# Patient Record
Sex: MALE | Race: BLACK OR AFRICAN AMERICAN | NOT HISPANIC OR LATINO | URBAN - METROPOLITAN AREA
[De-identification: names, ages, dates, MRNs, and addresses within clinical notes are randomized per-mention and may not be internally consistent; named-entity substitution may affect disease eponyms.]

---

## 2018-11-27 ENCOUNTER — EMERGENCY (EMERGENCY)
Facility: HOSPITAL | Age: 68
LOS: 1 days | Discharge: HOME | End: 2018-11-27
Admitting: EMERGENCY MEDICINE
Payer: MEDICARE

## 2018-11-27 VITALS
SYSTOLIC BLOOD PRESSURE: 177 MMHG | OXYGEN SATURATION: 100 % | DIASTOLIC BLOOD PRESSURE: 75 MMHG | TEMPERATURE: 98 F | RESPIRATION RATE: 18 BRPM | HEART RATE: 80 BPM

## 2018-11-27 LAB
APPEARANCE UR: CLEAR — SIGNIFICANT CHANGE UP
BACTERIA # UR AUTO: NEGATIVE — SIGNIFICANT CHANGE UP
BILIRUB UR-MCNC: NEGATIVE — SIGNIFICANT CHANGE UP
BLOOD UR QL VISUAL: SIGNIFICANT CHANGE UP
COLOR SPEC: YELLOW — SIGNIFICANT CHANGE UP
GLUCOSE UR-MCNC: NEGATIVE — SIGNIFICANT CHANGE UP
HYALINE CASTS # UR AUTO: NEGATIVE — SIGNIFICANT CHANGE UP
KETONES UR-MCNC: NEGATIVE — SIGNIFICANT CHANGE UP
LEUKOCYTE ESTERASE UR-ACNC: NEGATIVE — SIGNIFICANT CHANGE UP
NITRITE UR-MCNC: NEGATIVE — SIGNIFICANT CHANGE UP
PH UR: 6 — SIGNIFICANT CHANGE UP (ref 5–8)
PROT UR-MCNC: 70 — SIGNIFICANT CHANGE UP
RBC CASTS # UR COMP ASSIST: HIGH (ref 0–?)
SP GR SPEC: 1.02 — SIGNIFICANT CHANGE UP (ref 1–1.04)
SQUAMOUS # UR AUTO: SIGNIFICANT CHANGE UP
UROBILINOGEN FLD QL: NORMAL — SIGNIFICANT CHANGE UP
WBC UR QL: SIGNIFICANT CHANGE UP (ref 0–?)

## 2018-11-27 PROCEDURE — 99283 EMERGENCY DEPT VISIT LOW MDM: CPT

## 2018-11-27 RX ORDER — LIDOCAINE 4 G/100G
1 CREAM TOPICAL ONCE
Qty: 0 | Refills: 0 | Status: COMPLETED | OUTPATIENT
Start: 2018-11-27 | End: 2018-11-27

## 2018-11-27 RX ORDER — IBUPROFEN 200 MG
600 TABLET ORAL ONCE
Qty: 0 | Refills: 0 | Status: COMPLETED | OUTPATIENT
Start: 2018-11-27 | End: 2018-11-27

## 2018-11-27 RX ADMIN — LIDOCAINE 1 PATCH: 4 CREAM TOPICAL at 22:12

## 2018-11-27 RX ADMIN — Medication 600 MILLIGRAM(S): at 22:12

## 2018-11-27 NOTE — ED ADULT TRIAGE NOTE - CHIEF COMPLAINT QUOTE
Ambulatory complaining of back pain since Sunday, has not medicated for pain since it started, worse when laying down. Denies trauma/injury, urinary complications. PMH HTN, DM.

## 2018-11-27 NOTE — ED PROVIDER NOTE - NSFOLLOWUPINSTRUCTIONS_ED_ALL_ED_FT
Please take Motrin 600mg every 8 hours with food for pain.  Please use heat on affected area 3-4 times per day

## 2018-11-27 NOTE — ED PROVIDER NOTE - OBJECTIVE STATEMENT
67 y/o male pmh htn, dm c/o left lower back pain x3 days. Pt admits to left lower back pain, worse with lying down, better with sitting up. Pt admits to pain radiating to left side. Pt denies taking any otc meds. Denies recent fall, injury or trauma. Pt denies chest pain, sob, abd pain, n/v/d, dysuria, hematuria, numbness, tingling, weakness, bowel or bladder incontinence, dizziness, syncope, fever or chills.

## 2018-11-27 NOTE — ED PROVIDER NOTE - PHYSICAL EXAMINATION
back- no midline tenderness. + left paraspinal tenderness in SI region. straight leg raise neg. 5/5 muscle strength in b/l LE, distal sensation intact.

## 2024-03-12 ENCOUNTER — EMERGENCY (EMERGENCY)
Facility: HOSPITAL | Age: 74
LOS: 1 days | Discharge: HOME | End: 2024-03-12
Attending: STUDENT IN AN ORGANIZED HEALTH CARE EDUCATION/TRAINING PROGRAM | Admitting: STUDENT IN AN ORGANIZED HEALTH CARE EDUCATION/TRAINING PROGRAM
Payer: MEDICARE

## 2024-03-12 VITALS
SYSTOLIC BLOOD PRESSURE: 193 MMHG | HEART RATE: 72 BPM | RESPIRATION RATE: 16 BRPM | OXYGEN SATURATION: 100 % | TEMPERATURE: 99 F | DIASTOLIC BLOOD PRESSURE: 87 MMHG

## 2024-03-12 VITALS
RESPIRATION RATE: 16 BRPM | DIASTOLIC BLOOD PRESSURE: 99 MMHG | HEART RATE: 70 BPM | SYSTOLIC BLOOD PRESSURE: 240 MMHG | TEMPERATURE: 98 F | OXYGEN SATURATION: 98 %

## 2024-03-12 LAB
ALBUMIN SERPL ELPH-MCNC: 4.2 G/DL — SIGNIFICANT CHANGE UP (ref 3.3–5)
ALP SERPL-CCNC: 95 U/L — SIGNIFICANT CHANGE UP (ref 40–120)
ALT FLD-CCNC: 18 U/L — SIGNIFICANT CHANGE UP (ref 4–41)
ANION GAP SERPL CALC-SCNC: 14 MMOL/L — SIGNIFICANT CHANGE UP (ref 7–14)
AST SERPL-CCNC: 25 U/L — SIGNIFICANT CHANGE UP (ref 4–40)
BASE EXCESS BLDV CALC-SCNC: 4.2 MMOL/L — HIGH (ref -2–3)
BASOPHILS # BLD AUTO: 0.02 K/UL — SIGNIFICANT CHANGE UP (ref 0–0.2)
BASOPHILS NFR BLD AUTO: 0.4 % — SIGNIFICANT CHANGE UP (ref 0–2)
BILIRUB SERPL-MCNC: 0.4 MG/DL — SIGNIFICANT CHANGE UP (ref 0.2–1.2)
BLOOD GAS VENOUS COMPREHENSIVE RESULT: SIGNIFICANT CHANGE UP
BUN SERPL-MCNC: 12 MG/DL — SIGNIFICANT CHANGE UP (ref 7–23)
CALCIUM SERPL-MCNC: 10.3 MG/DL — SIGNIFICANT CHANGE UP (ref 8.4–10.5)
CHLORIDE BLDV-SCNC: 103 MMOL/L — SIGNIFICANT CHANGE UP (ref 96–108)
CHLORIDE SERPL-SCNC: 101 MMOL/L — SIGNIFICANT CHANGE UP (ref 98–107)
CK MB BLD-MCNC: 1.5 % — SIGNIFICANT CHANGE UP (ref 0–2.5)
CK MB CFR SERPL CALC: 1.9 NG/ML — SIGNIFICANT CHANGE UP
CK SERPL-CCNC: 131 U/L — SIGNIFICANT CHANGE UP (ref 30–200)
CO2 BLDV-SCNC: 31.8 MMOL/L — HIGH (ref 22–26)
CO2 SERPL-SCNC: 25 MMOL/L — SIGNIFICANT CHANGE UP (ref 22–31)
CREAT SERPL-MCNC: 0.82 MG/DL — SIGNIFICANT CHANGE UP (ref 0.5–1.3)
EGFR: 93 ML/MIN/1.73M2 — SIGNIFICANT CHANGE UP
EOSINOPHIL # BLD AUTO: 0.05 K/UL — SIGNIFICANT CHANGE UP (ref 0–0.5)
EOSINOPHIL NFR BLD AUTO: 1.1 % — SIGNIFICANT CHANGE UP (ref 0–6)
GAS PNL BLDV: 136 MMOL/L — SIGNIFICANT CHANGE UP (ref 136–145)
GLUCOSE BLDV-MCNC: 136 MG/DL — HIGH (ref 70–99)
GLUCOSE SERPL-MCNC: 131 MG/DL — HIGH (ref 70–99)
HCO3 BLDV-SCNC: 30 MMOL/L — HIGH (ref 22–29)
HCT VFR BLD CALC: 41.3 % — SIGNIFICANT CHANGE UP (ref 39–50)
HCT VFR BLDA CALC: 42 % — SIGNIFICANT CHANGE UP (ref 39–51)
HGB BLD CALC-MCNC: 14 G/DL — SIGNIFICANT CHANGE UP (ref 12.6–17.4)
HGB BLD-MCNC: 14 G/DL — SIGNIFICANT CHANGE UP (ref 13–17)
IANC: 3.47 K/UL — SIGNIFICANT CHANGE UP (ref 1.8–7.4)
IMM GRANULOCYTES NFR BLD AUTO: 0.4 % — SIGNIFICANT CHANGE UP (ref 0–0.9)
LACTATE BLDV-MCNC: 1.8 MMOL/L — SIGNIFICANT CHANGE UP (ref 0.5–2)
LYMPHOCYTES # BLD AUTO: 0.76 K/UL — LOW (ref 1–3.3)
LYMPHOCYTES # BLD AUTO: 16.3 % — SIGNIFICANT CHANGE UP (ref 13–44)
MAGNESIUM SERPL-MCNC: 1.9 MG/DL — SIGNIFICANT CHANGE UP (ref 1.6–2.6)
MCHC RBC-ENTMCNC: 30.8 PG — SIGNIFICANT CHANGE UP (ref 27–34)
MCHC RBC-ENTMCNC: 33.9 GM/DL — SIGNIFICANT CHANGE UP (ref 32–36)
MCV RBC AUTO: 90.8 FL — SIGNIFICANT CHANGE UP (ref 80–100)
MONOCYTES # BLD AUTO: 0.35 K/UL — SIGNIFICANT CHANGE UP (ref 0–0.9)
MONOCYTES NFR BLD AUTO: 7.5 % — SIGNIFICANT CHANGE UP (ref 2–14)
NEUTROPHILS # BLD AUTO: 3.47 K/UL — SIGNIFICANT CHANGE UP (ref 1.8–7.4)
NEUTROPHILS NFR BLD AUTO: 74.3 % — SIGNIFICANT CHANGE UP (ref 43–77)
NRBC # BLD: 0 /100 WBCS — SIGNIFICANT CHANGE UP (ref 0–0)
NRBC # FLD: 0 K/UL — SIGNIFICANT CHANGE UP (ref 0–0)
PCO2 BLDV: 50 MMHG — SIGNIFICANT CHANGE UP (ref 42–55)
PH BLDV: 7.39 — SIGNIFICANT CHANGE UP (ref 7.32–7.43)
PHOSPHATE SERPL-MCNC: 4.1 MG/DL — SIGNIFICANT CHANGE UP (ref 2.5–4.5)
PLATELET # BLD AUTO: 221 K/UL — SIGNIFICANT CHANGE UP (ref 150–400)
PO2 BLDV: 41 MMHG — SIGNIFICANT CHANGE UP (ref 25–45)
POTASSIUM BLDV-SCNC: 4.3 MMOL/L — SIGNIFICANT CHANGE UP (ref 3.5–5.1)
POTASSIUM SERPL-MCNC: 4.4 MMOL/L — SIGNIFICANT CHANGE UP (ref 3.5–5.3)
POTASSIUM SERPL-SCNC: 4.4 MMOL/L — SIGNIFICANT CHANGE UP (ref 3.5–5.3)
PROT SERPL-MCNC: 7.9 G/DL — SIGNIFICANT CHANGE UP (ref 6–8.3)
RBC # BLD: 4.55 M/UL — SIGNIFICANT CHANGE UP (ref 4.2–5.8)
RBC # FLD: 15 % — HIGH (ref 10.3–14.5)
SAO2 % BLDV: 63.8 % — LOW (ref 67–88)
SODIUM SERPL-SCNC: 140 MMOL/L — SIGNIFICANT CHANGE UP (ref 135–145)
TROPONIN T, HIGH SENSITIVITY RESULT: 14 NG/L — SIGNIFICANT CHANGE UP
TROPONIN T, HIGH SENSITIVITY RESULT: 18 NG/L — SIGNIFICANT CHANGE UP
WBC # BLD: 4.67 K/UL — SIGNIFICANT CHANGE UP (ref 3.8–10.5)
WBC # FLD AUTO: 4.67 K/UL — SIGNIFICANT CHANGE UP (ref 3.8–10.5)

## 2024-03-12 PROCEDURE — 93010 ELECTROCARDIOGRAM REPORT: CPT

## 2024-03-12 PROCEDURE — 99285 EMERGENCY DEPT VISIT HI MDM: CPT

## 2024-03-12 PROCEDURE — 70450 CT HEAD/BRAIN W/O DYE: CPT | Mod: 26,MC

## 2024-03-12 RX ORDER — ACETAMINOPHEN 500 MG
1000 TABLET ORAL ONCE
Refills: 0 | Status: COMPLETED | OUTPATIENT
Start: 2024-03-12 | End: 2024-03-12

## 2024-03-12 RX ORDER — SODIUM CHLORIDE 9 MG/ML
1000 INJECTION INTRAMUSCULAR; INTRAVENOUS; SUBCUTANEOUS ONCE
Refills: 0 | Status: COMPLETED | OUTPATIENT
Start: 2024-03-12 | End: 2024-03-12

## 2024-03-12 RX ADMIN — SODIUM CHLORIDE 1000 MILLILITER(S): 9 INJECTION INTRAMUSCULAR; INTRAVENOUS; SUBCUTANEOUS at 12:17

## 2024-03-12 RX ADMIN — Medication 400 MILLIGRAM(S): at 12:17

## 2024-03-12 NOTE — ED PROVIDER NOTE - NSICDXPASTMEDICALHX_GEN_ALL_CORE_FT
PAST MEDICAL HISTORY:  Diabetes Mellitus Type II (ICD9 250.00)     HTN - Hypertension     Prostate cancer

## 2024-03-12 NOTE — ED ADULT NURSE REASSESSMENT NOTE - NS ED NURSE REASSESS COMMENT FT1
BREAK RN: pt. remains A&Ox4, awake and resting. pt. endorsing a headache at this time, order placed and followed, will reassess. no acute distress noted. respirations even and unlabored. VS as noted. pending rpt lab work.

## 2024-03-12 NOTE — ED PROVIDER NOTE - OBJECTIVE STATEMENT
72 y/o M with HTN, T2DM on insulin and metformin, newly dx prostate cancer 11/2023 on XRT only came on due to home nurse's recommendation for elevated BP.     Pt states he has been experiencing mild dull gradual atraumatic non-radiating onset episodic generalized headaches since 09/2023 hat typically resolved with otc medication, that he did not take today. States he took his 40 of metoprolol and lisinopril at around 8 AM today and when then nurse check his BP was high so was told to home to the ER. No personal or family hx of stroke/ aneurysm    Denies change in vision, blurred vision, photophobia, phonophobia, slurred speech, paresthesias, weakness, body shaking, urine/bowel incontinence, memory loss/confusion, fever, chills, sore throat, cough, chest pain, palpitations, dyspnea on exertion, shortness of breath, leg swelling/pain, rash, neck pain/stiffness

## 2024-03-12 NOTE — ED ADULT TRIAGE NOTE - CHIEF COMPLAINT QUOTE
pt c/o hypertension noted since yesterday. visiting nurse was checking BP at home yesterday when noted it to be in the 200's systolically. reports took BP medication this morning. has been endorsing intermittent headaches X 4 months. denies numbness/tingling, blurry vision. neurosensory intact. past medical history of HTN, diabetes type 2. fingerstick 114.

## 2024-03-12 NOTE — ED ADULT TRIAGE NOTE - BEFAST EYES
No
I have personally provided ___42 minutes of critical care time exclusive of time spent on separately billable procedures. Time includes review of laboratory data, radiology results, discussion with consultants, and monitoring for potential decompensation. Interventions were performed as documented above

## 2024-03-12 NOTE — ED ADULT NURSE NOTE - NSFALLUNIVINTERV_ED_ALL_ED
Bed/Stretcher in lowest position, wheels locked, appropriate side rails in place/Call bell, personal items and telephone in reach/Instruct patient to call for assistance before getting out of bed/chair/stretcher/Non-slip footwear applied when patient is off stretcher/[place] to call system/Physically safe environment - no spills, clutter or unnecessary equipment/Purposeful proactive rounding/Room/bathroom lighting operational, light cord in reach

## 2024-03-12 NOTE — ED PROVIDER NOTE - PATIENT PORTAL LINK FT
You can access the FollowMyHealth Patient Portal offered by [facility] by registering at the following website: [email or web address]. By joining [employer]’s FollowMyHealth portal, you will also be able to view your health information using other applications (apps) compatible with our system.

## 2024-03-12 NOTE — ED ADULT TRIAGE NOTE - MODE OF ARRIVAL
Hx of COPD  Home medications albuterol and incurse ellipta  Patient c/o of mild chest tightness but states he hasnt used his albuterol today to "open his lungs"     CXR WNL    Plan:  · Continue albuterol inhaler   · Atrovent ordered as incruse ellipta non formulary [place]

## 2024-03-12 NOTE — ED PROVIDER NOTE - PROGRESS NOTE DETAILS
DERREK Lund: Pt states the headache has resolved. labs with no emergent findings, with no signs of end organ damage. CT head also with no emergent findings. Discussed with pt that antihypertensive likely needs to be adjusted for better BP control. Bp also improved from 240/99 --> 193/87. Pt states he will speak to his PMD for medication adjustment /change. Discussed strict return precautions and prompt f/u. Pt verbalized an understanding and agrees with the plan. IV removed.

## 2024-03-12 NOTE — ED ADULT NURSE NOTE - NSFALLOOBATTEMPT_ED_ALL_ED
MEDICATIONS  (STANDING):  amLODIPine   Tablet 10 milliGRAM(s) Oral daily  apixaban 5 milliGRAM(s) Oral two times a day  aspirin enteric coated 81 milliGRAM(s) Oral daily  cefTRIAXone   IVPB 1000 milliGRAM(s) IV Intermittent every 24 hours  cholecalciferol 2000 Unit(s) Oral daily  cloNIDine Patch 0.1 mG/24Hr(s) 1 patch Transdermal every 7 days  hydrALAZINE 50 milliGRAM(s) Oral every 8 hours  levETIRAcetam 500 milliGRAM(s) Oral two times a day  metoprolol tartrate 50 milliGRAM(s) Oral every 12 hours  simvastatin 20 milliGRAM(s) Oral at bedtime    MEDICATIONS  (PRN):  acetaminophen     Tablet .. 650 milliGRAM(s) Oral every 6 hours PRN Temp greater or equal to 38C (100.4F), Mild Pain (1 - 3)  hydrALAZINE Injectable 10 milliGRAM(s) IV Push every 4 hours PRN for SBP > 180 or DBP > 110  ondansetron Injectable 4 milliGRAM(s) IV Push every 8 hours PRN Nausea and/or Vomiting   No

## 2024-03-12 NOTE — ED PROVIDER NOTE - CLINICAL SUMMARY MEDICAL DECISION MAKING FREE TEXT BOX
74 y/o M with HTN, T2DM on insulin and metformin, newly dx prostate cancer 11/2023 on XRT only came on due to home nurse's recommendation for elevated BP.     Patient presenting for evaluation of asymptomatic hypertension other than episodic atraumatic mild dull headaches since september.  On exam and through history there was no evidence of hypertensive emergency or urgency.  Patient without headache, decreased urinary output, vision changes, chest pain, or abnormal symptoms for patient.  At this time, most likely diagnosis is essential hypertension without evidence of hypertensive urgency/emergency.  Labs and imaging were unlikely to change my management of this patient however given BP of 240/99 will obtain CBC, CMP, troponin r/o organ involvement. EKG. Also will order CTH given newly dx prostate cancer 09/2023 on XRT only with episodic headaches since 09/2023.

## 2024-03-12 NOTE — ED PROVIDER NOTE - ATTENDING APP SHARED VISIT CONTRIBUTION OF CARE
I (Angely) agree with above, I performed a history and physical. Counseled magdlaena medical staff, physician assistant, and/or medical student on medical decision making as documented. Medical decisions and treatment interventions were made in real time during the patient encounter. Additionally and/or with the following exceptions: 73-year-old past medical history hypertension, type 2 diabetes on insulin and metformin presents emergency department with hypertension.  Was noted to be hypertensive to the 240s.  Patient has mild headache..  Patient is neurologically intact well-appearing.  Basic screening labs were sent which revealed no end organ dysfunction.  Given the history of prostate cancer, screening head CT was performed to rule out mass. Workup was negative and patient was stable for discharge.

## 2024-03-12 NOTE — ED PROVIDER NOTE - NSFOLLOWUPINSTRUCTIONS_ED_ALL_ED_FT
Hypertension    WHAT YOU NEED TO KNOW:    What is hypertension? Hypertension is high blood pressure. Your blood pressure is the force of your blood moving against the walls of your arteries. Hypertension causes your blood pressure to get so high that your heart has to work much harder than normal. This can damage your heart. Hypertension that does not respond to medicines and lifestyle changes is called resistant hypertension. Hypertension is considered chronic when it continues for 3 months or longer.    What do I need to know about the stages of hypertension? Your healthcare provider will give you a blood pressure goal based on your age, health, and risk for cardiovascular disease. The following are general guidelines on the stages of hypertension:    Normal blood pressure is 119/79 or lower. Your healthcare provider may only check your blood pressure each year if it stays at a normal level.    Elevated blood pressure is 120/79 to 129/79. This is sometimes called prehypertension. Your healthcare provider may suggest lifestyle changes to help lower your blood pressure to a normal level. He or she may then check it again in 3 to 6 months.    Stage 1 hypertension is 130/80 to 139/89. Your provider may recommend lifestyle changes, medication, and checks every 3 to 6 months until your blood pressure is controlled.    Stage 2 hypertension is 140/90 or higher. Your provider will recommend lifestyle changes and have you take 2 kinds of hypertension medicines. You will also need to have your blood pressure checked monthly until it is controlled.  Blood Pressure Readings  What increases my risk for hypertension? The cause of hypertension may not be known. This is called essential or primary hypertension. Hypertension caused by another medical condition, such as kidney disease, is called secondary hypertension. Any of the following can increase your risk:    Age older than 55 years (men) or 65 (women)    Stress, or a family history of hypertension or heart disease    Obesity, lack of exercise, or too many high-sodium foods    Use of tobacco, alcohol, or illegal drugs    A medical condition, such as diabetes, kidney disease, thyroid disease, or adrenal gland disorder    Certain medicines, such as steroids or birth control pills  What are the signs and symptoms of hypertension? You may have no signs or symptoms, or you may have any of the following:    Headache    Blurred vision    Chest pain    Dizziness or weakness    Trouble breathing    Nosebleeds  How is hypertension diagnosed? Your healthcare provider will take your blood pressure at several visits. You may also need to check your blood pressure at home. The provider will examine you and ask about medicines you take. He or she will also ask if you have a family history of high blood pressure and about any health conditions you have. He or she will also check your blood pressure and weight and examine your heart, lungs, and eyes. You may need any of the following tests:    An ambulatory blood pressure monitor (ABPM) is a device that you wear. ABPM measures your blood pressure while you do your regular daily activities. It records your blood pressure every 15 to 30 minutes during the day. It also records your blood pressure every 15 minutes to 1 hour at night. The recorded blood pressures help your healthcare provider know if you have hypertension not seen at your appointment.    Blood tests may help healthcare providers find the cause of your hypertension. Blood tests can also help find other health problems caused by hypertension.    Urine tests will be done to check your kidney function. Kidney problems can increase your risk for hypertension.  Which medicines are used to treat hypertension?    Antihypertensives may be used to help lower your blood pressure. Several kinds of medicines are available. Your healthcare provider will choose medicines based on the kind of hypertension you have. You may need more than one type of medicine. Take the medicine exactly as directed.    Diuretics help decrease extra fluid that collects in your body. This will help lower your blood pressure. You may urinate more often while you take this medicine.    Cholesterol medicine helps lower your cholesterol level. A low cholesterol level helps prevent heart disease and makes it easier to control your blood pressure.  What can I do to manage hypertension?    Check your blood pressure at home. Do not smoke, have caffeine, or exercise for at least 30 minutes before you check your blood pressure. Sit and rest for 5 minutes before you check your blood pressure. Extend your arm and support it on a flat surface. Your arm should be at the same level as your heart. Follow the directions that came with your blood pressure monitor. Check your blood pressure 2 times, 1 minute apart, before you take your medicine in the morning. Also check your blood pressure before your evening meal. Keep a record of your readings and bring it to your follow-up visits. Ask your healthcare provider what your blood pressure should be.  How to take a Blood Pressure      Manage any other health conditions you have. Health conditions such as diabetes can increase your risk for hypertension. Follow your healthcare provider's instructions and take all your medicines as directed.    Ask about all medicines. Certain medicines can increase your blood pressure. Examples include oral birth control pills, decongestants, herbal supplements, and NSAIDs, such as ibuprofen. Your healthcare provider can tell you which medicines are safe for you to take. This includes prescription and over-the-counter medicines.  What lifestyle changes can I make to manage hypertension? Your healthcare provider may recommend you work with a team to manage hypertension. The team may include medical experts such as a dietitian, an exercise or physical therapist, and a behavior therapist. Your family members may be included in helping you create lifestyle changes.  Ways to Lower Your Blood Pressure    Limit sodium (salt) as directed. Too much sodium can affect your fluid balance. Check labels to find low-sodium or no-salt-added foods. Some low-sodium foods use potassium salts for flavor. Too much potassium can also cause health problems. Your healthcare provider will tell you how much sodium and potassium are safe for you to have in a day. He or she may recommend that you limit sodium to 2,300 mg a day.        Follow the meal plan recommended by your healthcare provider. A dietitian or your provider can give you more information on low-sodium plans or the DASH (Dietary Approaches to Stop Hypertension) eating plan. The DASH plan is low in sodium, processed sugar, unhealthy fats, and total fat. It is high in potassium, calcium, and fiber. These can be found in vegetables, fruit, and whole-grain foods.        Be physically active throughout the day. Physical activity, such as exercise, can help control your blood pressure and your weight. Be physically active for at least 30 minutes per day, on most days of the week. Include aerobic activity, such as walking or riding a bicycle. Also include strength training at least 2 times each week. Your healthcare providers can help you create a physical activity plan.  Ways to Be Physically Active  Strength Training for Adults      Decrease stress. This may help lower your blood pressure. Learn ways to relax, such as deep breathing or listening to music.    Limit alcohol as directed. Alcohol can increase your blood pressure. A drink of alcohol is 12 ounces of beer, 5 ounces of wine, or 1½ ounces of liquor.    Do not smoke. Nicotine and other chemicals in cigarettes and cigars can increase your blood pressure and also cause lung damage. Ask your healthcare provider for information if you currently smoke and need help to quit. E-cigarettes or smokeless tobacco still contain nicotine. Talk to your healthcare provider before you use these products.  Prevent Heart Disease   Call your local emergency number (853 in the [place]) or have someone call if:    You have chest pain.    You have any of the following signs of a heart attack:  Squeezing, pressure, or pain in your chest    You may also have any of the following:  Discomfort or pain in your back, neck, jaw, stomach, or arm    Shortness of breath    Nausea or vomiting    Lightheadedness or a sudden cold sweat    You become confused or have trouble speaking.    You suddenly feel lightheaded or have trouble breathing.  When should I seek immediate care?    You have a severe headache or vision loss.    You have weakness in an arm or leg.  When should I call my doctor?    You feel faint, dizzy, confused, or drowsy.    You have been taking your blood pressure medicine but your pressure is higher than your provider says it should be.    You have questions or concerns about your condition or care.  CARE AGREEMENT:    You have the right to help plan your care. Learn about your health condition and how it may be treated. Discuss treatment options with your healthcare providers to decide what care you want to receive. You always have the right to refuse treatment.

## 2024-03-12 NOTE — ED ADULT NURSE NOTE - OBJECTIVE STATEMENT
Pt received to rm 24  , awake and alert, A&OX4, ambulatory. C/o pt c/o hypertension noted since yesterday. visiting nurse was checking BP at home yesterday when noted it to be in the 200's systolically. reports took BP medication this morning. has been endorsing intermittent headaches X 4 months. denies numbness/tingling, blurry vision. neurosensory intact. past medical history of HTN, diabetes type 2.    Respirations even and unlabored. Denies CP, SOB, N/V, HA, dizziness, palpitations, blurry vision. 20G IV placed to left wrist.     . Bed in lowest position, call bell within reach. Safety maintained.

## 2024-07-09 ENCOUNTER — INPATIENT (INPATIENT)
Facility: HOSPITAL | Age: 74
LOS: 2 days | Discharge: HOME | End: 2024-07-12
Attending: INTERNAL MEDICINE | Admitting: INTERNAL MEDICINE
Payer: MEDICARE

## 2024-07-09 VITALS
WEIGHT: 214.95 LBS | OXYGEN SATURATION: 98 % | HEART RATE: 79 BPM | TEMPERATURE: 99 F | SYSTOLIC BLOOD PRESSURE: 175 MMHG | RESPIRATION RATE: 16 BRPM | DIASTOLIC BLOOD PRESSURE: 65 MMHG

## 2024-07-09 DIAGNOSIS — R42 DIZZINESS AND GIDDINESS: ICD-10-CM

## 2024-07-09 PROBLEM — C61 MALIGNANT NEOPLASM OF PROSTATE: Chronic | Status: ACTIVE | Noted: 2024-03-12

## 2024-07-09 LAB
A1C WITH ESTIMATED AVERAGE GLUCOSE RESULT: 7.9 % — HIGH (ref 4–5.6)
ALBUMIN SERPL ELPH-MCNC: 3.9 G/DL — SIGNIFICANT CHANGE UP (ref 3.3–5)
ALP SERPL-CCNC: 98 U/L — SIGNIFICANT CHANGE UP (ref 40–120)
ALT FLD-CCNC: 18 U/L — SIGNIFICANT CHANGE UP (ref 4–41)
ANION GAP SERPL CALC-SCNC: 15 MMOL/L — HIGH (ref 7–14)
APTT BLD: 31 SEC — SIGNIFICANT CHANGE UP (ref 24.5–35.6)
AST SERPL-CCNC: 18 U/L — SIGNIFICANT CHANGE UP (ref 4–40)
BASOPHILS # BLD AUTO: 0.03 K/UL — SIGNIFICANT CHANGE UP (ref 0–0.2)
BASOPHILS NFR BLD AUTO: 0.5 % — SIGNIFICANT CHANGE UP (ref 0–2)
BILIRUB SERPL-MCNC: 0.3 MG/DL — SIGNIFICANT CHANGE UP (ref 0.2–1.2)
BUN SERPL-MCNC: 32 MG/DL — HIGH (ref 7–23)
CALCIUM SERPL-MCNC: 10.1 MG/DL — SIGNIFICANT CHANGE UP (ref 8.4–10.5)
CHLORIDE SERPL-SCNC: 104 MMOL/L — SIGNIFICANT CHANGE UP (ref 98–107)
CO2 SERPL-SCNC: 24 MMOL/L — SIGNIFICANT CHANGE UP (ref 22–31)
CREAT SERPL-MCNC: 1.7 MG/DL — HIGH (ref 0.5–1.3)
D DIMER BLD IA.RAPID-MCNC: 1968 NG/ML DDU — HIGH
EGFR: 42 ML/MIN/1.73M2 — LOW
EOSINOPHIL # BLD AUTO: 0.16 K/UL — SIGNIFICANT CHANGE UP (ref 0–0.5)
EOSINOPHIL NFR BLD AUTO: 2.6 % — SIGNIFICANT CHANGE UP (ref 0–6)
ESTIMATED AVERAGE GLUCOSE: 180 — SIGNIFICANT CHANGE UP
GLUCOSE SERPL-MCNC: 112 MG/DL — HIGH (ref 70–99)
HCT VFR BLD CALC: 35.9 % — LOW (ref 39–50)
HGB BLD-MCNC: 11.7 G/DL — LOW (ref 13–17)
IANC: 3.81 K/UL — SIGNIFICANT CHANGE UP (ref 1.8–7.4)
IMM GRANULOCYTES NFR BLD AUTO: 0.3 % — SIGNIFICANT CHANGE UP (ref 0–0.9)
INR BLD: <0.9 RATIO — LOW (ref 0.85–1.18)
LYMPHOCYTES # BLD AUTO: 1.63 K/UL — SIGNIFICANT CHANGE UP (ref 1–3.3)
LYMPHOCYTES # BLD AUTO: 26.3 % — SIGNIFICANT CHANGE UP (ref 13–44)
MAGNESIUM SERPL-MCNC: 2.1 MG/DL — SIGNIFICANT CHANGE UP (ref 1.6–2.6)
MCHC RBC-ENTMCNC: 32.1 PG — SIGNIFICANT CHANGE UP (ref 27–34)
MCHC RBC-ENTMCNC: 32.6 GM/DL — SIGNIFICANT CHANGE UP (ref 32–36)
MCV RBC AUTO: 98.4 FL — SIGNIFICANT CHANGE UP (ref 80–100)
MONOCYTES # BLD AUTO: 0.54 K/UL — SIGNIFICANT CHANGE UP (ref 0–0.9)
MONOCYTES NFR BLD AUTO: 8.7 % — SIGNIFICANT CHANGE UP (ref 2–14)
NEUTROPHILS # BLD AUTO: 3.81 K/UL — SIGNIFICANT CHANGE UP (ref 1.8–7.4)
NEUTROPHILS NFR BLD AUTO: 61.6 % — SIGNIFICANT CHANGE UP (ref 43–77)
NRBC # BLD: 0 /100 WBCS — SIGNIFICANT CHANGE UP (ref 0–0)
NRBC # FLD: 0 K/UL — SIGNIFICANT CHANGE UP (ref 0–0)
PLATELET # BLD AUTO: 239 K/UL — SIGNIFICANT CHANGE UP (ref 150–400)
POTASSIUM SERPL-MCNC: 4.8 MMOL/L — SIGNIFICANT CHANGE UP (ref 3.5–5.3)
POTASSIUM SERPL-SCNC: 4.8 MMOL/L — SIGNIFICANT CHANGE UP (ref 3.5–5.3)
PROT SERPL-MCNC: 7.4 G/DL — SIGNIFICANT CHANGE UP (ref 6–8.3)
PROTHROM AB SERPL-ACNC: 9.5 SEC — SIGNIFICANT CHANGE UP (ref 9.5–13)
RBC # BLD: 3.65 M/UL — LOW (ref 4.2–5.8)
RBC # FLD: 15.8 % — HIGH (ref 10.3–14.5)
SODIUM SERPL-SCNC: 143 MMOL/L — SIGNIFICANT CHANGE UP (ref 135–145)
TROPONIN T, HIGH SENSITIVITY RESULT: 19 NG/L — SIGNIFICANT CHANGE UP
TROPONIN T, HIGH SENSITIVITY RESULT: 21 NG/L — SIGNIFICANT CHANGE UP
TSH SERPL-MCNC: 0.61 UIU/ML — SIGNIFICANT CHANGE UP (ref 0.27–4.2)
WBC # BLD: 6.19 K/UL — SIGNIFICANT CHANGE UP (ref 3.8–10.5)
WBC # FLD AUTO: 6.19 K/UL — SIGNIFICANT CHANGE UP (ref 3.8–10.5)

## 2024-07-09 PROCEDURE — 71046 X-RAY EXAM CHEST 2 VIEWS: CPT | Mod: 26

## 2024-07-09 PROCEDURE — 71275 CT ANGIOGRAPHY CHEST: CPT | Mod: 26,MC

## 2024-07-09 PROCEDURE — 99285 EMERGENCY DEPT VISIT HI MDM: CPT

## 2024-07-09 RX ORDER — METFORMIN HYDROCHLORIDE 850 MG/1
1 TABLET, FILM COATED ORAL
Refills: 0 | DISCHARGE

## 2024-07-09 RX ORDER — METOPROLOL TARTRATE 50 MG
1 TABLET ORAL
Refills: 0 | DISCHARGE

## 2024-07-09 RX ORDER — CLONIDINE HYDROCHLORIDE 0.3 MG/1
1 TABLET ORAL
Refills: 0 | DISCHARGE

## 2024-07-09 RX ORDER — GLIPIZIDE 5 MG
1 TABLET ORAL
Refills: 0 | DISCHARGE

## 2024-07-09 RX ORDER — CILOSTAZOL 50 MG/1
1 TABLET ORAL
Refills: 0 | DISCHARGE

## 2024-07-09 RX ORDER — ASPIRIN 325 MG/1
1 TABLET, FILM COATED ORAL
Refills: 0 | DISCHARGE

## 2024-07-09 RX ORDER — TAMSULOSIN HYDROCHLORIDE 0.4 MG/1
1 CAPSULE ORAL
Refills: 0 | DISCHARGE

## 2024-07-09 RX ORDER — INSULIN GLARGINE 100 [IU]/ML
40 INJECTION, SOLUTION SUBCUTANEOUS
Refills: 0 | DISCHARGE

## 2024-07-09 RX ORDER — ATORVASTATIN CALCIUM 20 MG/1
1 TABLET, FILM COATED ORAL
Refills: 0 | DISCHARGE

## 2024-07-09 RX ORDER — LISINOPRIL 5 MG/1
1 TABLET ORAL
Refills: 0 | DISCHARGE

## 2024-07-09 NOTE — ED PROVIDER NOTE - OBJECTIVE STATEMENT
74-year-old male past medical history prostate cancer status post radiation last treatment in March, hypertension, type 2 diabetes non-insulin-dependent presents with palpitations and dizziness.  Patient endorses 3 months of palpitations, when ascending stairs. Endorses dizziness after walking up stairs starting 3 months ago as well. Dizziness is not worsened with head movement, not associated with room spinning sensation, no nausea/vomiting. Patient states he presents to ED today because of worsening dizziness yesterday when climbing stairs, lasting approximately 1 to 2 minutes.  Patient states that self resolved by standing still.  Patient denies chest pain, shortness of breath, difficulty breathing, headache, fever, chills, body aches. Patient denies recent travel, leg swelling,

## 2024-07-09 NOTE — ED ADULT NURSE NOTE - NSFALLHARMRISKINTERV_ED_ALL_ED

## 2024-07-09 NOTE — PHARMACOTHERAPY INTERVENTION NOTE - COMMENTS
Medication history is complete. Medication list updated in Outpatient Medication Record (OMR) per patient, [facility], and [facility].     Home Medications:  aspirin 81 mg oral delayed release tablet: 1 tab(s) orally once a day (09 Jul 2024 19:40)  atorvastatin 40 mg oral tablet: 1 tab(s) orally once a day (09 Jul 2024 19:39)  chlorthalidone 25 mg oral tablet: 1 tab(s) orally once a day (09 Jul 2024 19:39)  cilostazol 50 mg oral tablet: 1 tab(s) orally 2 times a day (09 Jul 2024 19:39)  cloNIDine 0.2 mg oral tablet: 1 tab(s) orally once a day (at bedtime) (09 Jul 2024 19:39)  glipiZIDE 10 mg oral tablet, extended release: 1 tab(s) orally once a day (09 Jul 2024 19:39)  IsoRel OTC supplement: Take orally as directed (09 Jul 2024 19:40)  lisinopril 40 mg oral tablet: 1 tab(s) orally once a day (09 Jul 2024 19:39)  metFORMIN 1000 mg oral tablet: 1 tab(s) orally 2 times a day (09 Jul 2024 19:39)  metoprolol succinate 50 mg oral tablet, extended release: 1 tab(s) orally once a day (09 Jul 2024 19:39)  tamsulosin 0.4 mg oral capsule: 1 cap(s) orally once a day (09 Jul 2024 19:39)  Toujeo SoloStar 300 units/mL subcutaneous solution: 40 unit(s) subcutaneous once a day after dinner (09 Jul 2024 19:39)    Please call spectra [phone] if you have any questions.

## 2024-07-09 NOTE — ED PROVIDER NOTE - ATTENDING CONTRIBUTION TO CARE
75 yo M hx HTN, HLD, DM2, prostate cancer s/p radiation, presenting with complaints of dizziness with exertion over the past few months, now with palpitations with exertion. Pt states he was in the pool at the gym yesterday and felt dizziness with palpitations and sob after doing laps.

## 2024-07-09 NOTE — ED ADULT TRIAGE NOTE - AS TEMP SITE
FETAL SURVEY  A SINGLE VIABLE IUP AT 20W1D GA BY LMP IS SEEN. FETAL CARDIAC MOTION OBSERVED. FETAL ANATOMY WELL VISUALIZED AND APPEARS WITHIN NORMAL LIMITS. NO ABNORMALITIES ARE SEEN ON TODAY'S EXAM.  FACE, NOSE/LIPS, PROFILE, CSP, LAT VENT, CER/CM, CP, SPINE, KIDNEYS, STOMACH/DIAPHRAGM, BLADDER, 3VC,  CORD INSERTION, RVOT, LVOT, 4CH, AO, DA, 3VV, ARMS, LEGS, HANDS, FEET. APPROPRIATE FETAL GROWTH IS SEEN. SIZE=DATES. JOANNA, CERVIX AND PLACENTA APPEAR WITHIN NORMAL LIMITS.   GENDER: XX  Problem List  Date Reviewed: 8/12/2021        Codes Class Noted    Supervision of other normal pregnancy ICD-10-CM: Z34.80  ICD-9-CM: V22.1  6/8/2021    Overview Addendum 8/20/2021  9:35 AM by Cesia Perez     Intrauterine pregnancy with the following problems identified:   EDC 1/19/2022 by D=US (ACOG)  NIPTS- normal  Horizon- neg  ?covid shot  AFP- Positive MFM 8/18  MFM - clot above cervix on ultrasound- addressed @MFM 8/18             ACL (anterior cruciate ligament) rupture ICD-10-CM: P06.158V  ICD-9-CM: 844.2  12/2/2019        Environmental allergies ICD-10-CM: Z91.09  ICD-9-CM: V15.09  3/23/2012
Saw MFM due to elevated AFP - has fu 10/1  US today normal anatomy and growth    Disc covid vaccine again
oral

## 2024-07-09 NOTE — ED PROVIDER NOTE - CLINICAL SUMMARY MEDICAL DECISION MAKING FREE TEXT BOX
74-year-old male past medical history prostate cancer status post radiation last treatment in March, hypertension, type 2 diabetes non-insulin-dependent presents with palpitations and dizziness.  Patient endorses 3 months of palpitations, when ascending stairs. Endorses dizziness after walking up stairs starting 3 months ago as well. Dizziness is not worsened with head movement, not associated with room spinning sensation, no nausea/vomiting. On arrival pt. is hypertensive, nonfebrile, normal HR, satting 98% on RA. On physical examination the patient has no cardiac murmurs, lungs clear to auscultation b/l, no wheezing/rales/rhonchi, abdomen soft/nd/nt, no peripheral edema/swelling, no focal neurological deficits, pulses radial and DP/PT 2+, extremities are warm. Differential includes ACS, PNA, anemia, electrolyte abnormality, dehydration. Plan to order cbc/cmp/troponin/cxr/tsh, A1C.

## 2024-07-09 NOTE — ED ADULT TRIAGE NOTE - CHIEF COMPLAINT QUOTE
c/o palpitations X couple of months. also says over the last couple months, endorsing dizziness upon activity/movement. denies SOB, N/V, fevers, chills. hx of DM II, HTN, prostate cancer. .

## 2024-07-09 NOTE — ED ADULT NURSE NOTE - OBJECTIVE STATEMENT
Patient is awake and alert, states having palpitations with activity , denies any other symptoms, no chest pain. IV placed, labs sent.

## 2024-07-09 NOTE — ED ADULT NURSE REASSESSMENT NOTE - NS ED NURSE REASSESS COMMENT FT1
Report received from [facility] SHELBY Carter. Pt resting comfortably at this time, respirations even and unlabored, appears in NAD. Respirations even and unlabored. Lung sounds clear with equal chest rise bilaterally. No complaints of chest pain, headache, nausea, dizziness, vomiting  SOB, fever, chills verbalized. Placed on cardiac monitor. Admitted awaiting on bed assignment.

## 2024-07-10 DIAGNOSIS — N17.9 ACUTE KIDNEY FAILURE, UNSPECIFIED: ICD-10-CM

## 2024-07-10 DIAGNOSIS — E11.9 TYPE 2 DIABETES MELLITUS WITHOUT COMPLICATIONS: ICD-10-CM

## 2024-07-10 DIAGNOSIS — K76.9 LIVER DISEASE, UNSPECIFIED: ICD-10-CM

## 2024-07-10 DIAGNOSIS — Z29.9 ENCOUNTER FOR PROPHYLACTIC MEASURES, UNSPECIFIED: ICD-10-CM

## 2024-07-10 DIAGNOSIS — I10 ESSENTIAL (PRIMARY) HYPERTENSION: ICD-10-CM

## 2024-07-10 DIAGNOSIS — Z79.899 OTHER LONG TERM (CURRENT) DRUG THERAPY: ICD-10-CM

## 2024-07-10 DIAGNOSIS — R00.2 PALPITATIONS: ICD-10-CM

## 2024-07-10 LAB
ALBUMIN SERPL ELPH-MCNC: 4 G/DL — SIGNIFICANT CHANGE UP (ref 3.3–5)
ALP SERPL-CCNC: 92 U/L — SIGNIFICANT CHANGE UP (ref 40–120)
ALT FLD-CCNC: 16 U/L — SIGNIFICANT CHANGE UP (ref 4–41)
ANION GAP SERPL CALC-SCNC: 15 MMOL/L — HIGH (ref 7–14)
APPEARANCE UR: CLEAR — SIGNIFICANT CHANGE UP
AST SERPL-CCNC: 16 U/L — SIGNIFICANT CHANGE UP (ref 4–40)
BACTERIA # UR AUTO: NEGATIVE /HPF — SIGNIFICANT CHANGE UP
BASOPHILS # BLD AUTO: 0.03 K/UL — SIGNIFICANT CHANGE UP (ref 0–0.2)
BASOPHILS NFR BLD AUTO: 0.5 % — SIGNIFICANT CHANGE UP (ref 0–2)
BILIRUB SERPL-MCNC: 0.6 MG/DL — SIGNIFICANT CHANGE UP (ref 0.2–1.2)
BILIRUB UR-MCNC: NEGATIVE — SIGNIFICANT CHANGE UP
BUN SERPL-MCNC: 26 MG/DL — HIGH (ref 7–23)
CALCIUM SERPL-MCNC: 10.1 MG/DL — SIGNIFICANT CHANGE UP (ref 8.4–10.5)
CAST: 0 /LPF — SIGNIFICANT CHANGE UP (ref 0–4)
CHLORIDE SERPL-SCNC: 103 MMOL/L — SIGNIFICANT CHANGE UP (ref 98–107)
CO2 SERPL-SCNC: 25 MMOL/L — SIGNIFICANT CHANGE UP (ref 22–31)
COLOR SPEC: YELLOW — SIGNIFICANT CHANGE UP
CREAT ?TM UR-MCNC: 100 MG/DL — SIGNIFICANT CHANGE UP
CREAT SERPL-MCNC: 1.21 MG/DL — SIGNIFICANT CHANGE UP (ref 0.5–1.3)
DIFF PNL FLD: NEGATIVE — SIGNIFICANT CHANGE UP
EGFR: 63 ML/MIN/1.73M2 — SIGNIFICANT CHANGE UP
EOSINOPHIL # BLD AUTO: 0.13 K/UL — SIGNIFICANT CHANGE UP (ref 0–0.5)
EOSINOPHIL NFR BLD AUTO: 2.3 % — SIGNIFICANT CHANGE UP (ref 0–6)
GLUCOSE BLDC GLUCOMTR-MCNC: 252 MG/DL — HIGH (ref 70–99)
GLUCOSE BLDC GLUCOMTR-MCNC: 253 MG/DL — HIGH (ref 70–99)
GLUCOSE BLDC GLUCOMTR-MCNC: 305 MG/DL — HIGH (ref 70–99)
GLUCOSE BLDC GLUCOMTR-MCNC: 309 MG/DL — HIGH (ref 70–99)
GLUCOSE SERPL-MCNC: 67 MG/DL — LOW (ref 70–99)
GLUCOSE UR QL: 250 MG/DL
HCT VFR BLD CALC: 36.5 % — LOW (ref 39–50)
HGB BLD-MCNC: 12.2 G/DL — LOW (ref 13–17)
IANC: 3.82 K/UL — SIGNIFICANT CHANGE UP (ref 1.8–7.4)
IMM GRANULOCYTES NFR BLD AUTO: 0.4 % — SIGNIFICANT CHANGE UP (ref 0–0.9)
KETONES UR-MCNC: 15 MG/DL
LEUKOCYTE ESTERASE UR-ACNC: NEGATIVE — SIGNIFICANT CHANGE UP
LYMPHOCYTES # BLD AUTO: 1.16 K/UL — SIGNIFICANT CHANGE UP (ref 1–3.3)
LYMPHOCYTES # BLD AUTO: 20.6 % — SIGNIFICANT CHANGE UP (ref 13–44)
MAGNESIUM SERPL-MCNC: 2 MG/DL — SIGNIFICANT CHANGE UP (ref 1.6–2.6)
MCHC RBC-ENTMCNC: 32.2 PG — SIGNIFICANT CHANGE UP (ref 27–34)
MCHC RBC-ENTMCNC: 33.4 GM/DL — SIGNIFICANT CHANGE UP (ref 32–36)
MCV RBC AUTO: 96.3 FL — SIGNIFICANT CHANGE UP (ref 80–100)
MONOCYTES # BLD AUTO: 0.46 K/UL — SIGNIFICANT CHANGE UP (ref 0–0.9)
MONOCYTES NFR BLD AUTO: 8.2 % — SIGNIFICANT CHANGE UP (ref 2–14)
NEUTROPHILS # BLD AUTO: 3.82 K/UL — SIGNIFICANT CHANGE UP (ref 1.8–7.4)
NEUTROPHILS NFR BLD AUTO: 68 % — SIGNIFICANT CHANGE UP (ref 43–77)
NITRITE UR-MCNC: NEGATIVE — SIGNIFICANT CHANGE UP
NRBC # BLD: 0 /100 WBCS — SIGNIFICANT CHANGE UP (ref 0–0)
NRBC # FLD: 0 K/UL — SIGNIFICANT CHANGE UP (ref 0–0)
OSMOLALITY UR: 623 MOSM/KG — SIGNIFICANT CHANGE UP (ref 50–1200)
PH UR: 6 — SIGNIFICANT CHANGE UP (ref 5–8)
PHOSPHATE SERPL-MCNC: 3.8 MG/DL — SIGNIFICANT CHANGE UP (ref 2.5–4.5)
PLATELET # BLD AUTO: 255 K/UL — SIGNIFICANT CHANGE UP (ref 150–400)
POTASSIUM SERPL-MCNC: 4 MMOL/L — SIGNIFICANT CHANGE UP (ref 3.5–5.3)
POTASSIUM SERPL-SCNC: 4 MMOL/L — SIGNIFICANT CHANGE UP (ref 3.5–5.3)
PROT SERPL-MCNC: 7.7 G/DL — SIGNIFICANT CHANGE UP (ref 6–8.3)
PROT UR-MCNC: SIGNIFICANT CHANGE UP MG/DL
RBC # BLD: 3.79 M/UL — LOW (ref 4.2–5.8)
RBC # FLD: 15.4 % — HIGH (ref 10.3–14.5)
RBC CASTS # UR COMP ASSIST: 0 /HPF — SIGNIFICANT CHANGE UP (ref 0–4)
SODIUM SERPL-SCNC: 143 MMOL/L — SIGNIFICANT CHANGE UP (ref 135–145)
SODIUM UR-SCNC: 110 MMOL/L — SIGNIFICANT CHANGE UP
SP GR SPEC: 1.02 — SIGNIFICANT CHANGE UP (ref 1–1.03)
SQUAMOUS # UR AUTO: 0 /HPF — SIGNIFICANT CHANGE UP (ref 0–5)
UROBILINOGEN FLD QL: 1 MG/DL — SIGNIFICANT CHANGE UP (ref 0.2–1)
UUN UR-MCNC: 883 MG/DL — SIGNIFICANT CHANGE UP
WBC # BLD: 5.62 K/UL — SIGNIFICANT CHANGE UP (ref 3.8–10.5)
WBC # FLD AUTO: 5.62 K/UL — SIGNIFICANT CHANGE UP (ref 3.8–10.5)
WBC UR QL: 0 /HPF — SIGNIFICANT CHANGE UP (ref 0–5)

## 2024-07-10 PROCEDURE — 99233 SBSQ HOSP IP/OBS HIGH 50: CPT

## 2024-07-10 PROCEDURE — 76770 US EXAM ABDO BACK WALL COMP: CPT | Mod: 26

## 2024-07-10 PROCEDURE — 99223 1ST HOSP IP/OBS HIGH 75: CPT

## 2024-07-10 RX ORDER — DEXTROSE MONOHYDRATE AND SODIUM CHLORIDE 5; .3 G/100ML; G/100ML
1000 INJECTION, SOLUTION INTRAVENOUS
Refills: 0 | Status: DISCONTINUED | OUTPATIENT
Start: 2024-07-10 | End: 2024-07-12

## 2024-07-10 RX ORDER — HEPARIN SODIUM 50 [USP'U]/ML
5000 INJECTION, SOLUTION INTRAVENOUS EVERY 12 HOURS
Refills: 0 | Status: DISCONTINUED | OUTPATIENT
Start: 2024-07-10 | End: 2024-07-12

## 2024-07-10 RX ORDER — INSULIN GLARGINE 100 [IU]/ML
10 INJECTION, SOLUTION SUBCUTANEOUS AT BEDTIME
Refills: 0 | Status: DISCONTINUED | OUTPATIENT
Start: 2024-07-10 | End: 2024-07-12

## 2024-07-10 RX ORDER — GLUCAGON HYDROCHLORIDE 1 MG/ML
1 INJECTION, POWDER, FOR SOLUTION INTRAMUSCULAR; INTRAVENOUS; SUBCUTANEOUS ONCE
Refills: 0 | Status: DISCONTINUED | OUTPATIENT
Start: 2024-07-10 | End: 2024-07-12

## 2024-07-10 RX ORDER — MAGNESIUM, ALUMINUM HYDROXIDE 400-400
30 TABLET,CHEWABLE ORAL EVERY 4 HOURS
Refills: 0 | Status: DISCONTINUED | OUTPATIENT
Start: 2024-07-10 | End: 2024-07-12

## 2024-07-10 RX ORDER — ATORVASTATIN CALCIUM 20 MG/1
40 TABLET, FILM COATED ORAL AT BEDTIME
Refills: 0 | Status: DISCONTINUED | OUTPATIENT
Start: 2024-07-10 | End: 2024-07-12

## 2024-07-10 RX ORDER — DEXTROSE 30 % IN WATER 30 %
25 VIAL (ML) INTRAVENOUS ONCE
Refills: 0 | Status: DISCONTINUED | OUTPATIENT
Start: 2024-07-10 | End: 2024-07-12

## 2024-07-10 RX ORDER — TAMSULOSIN HYDROCHLORIDE 0.4 MG/1
0.4 CAPSULE ORAL AT BEDTIME
Refills: 0 | Status: DISCONTINUED | OUTPATIENT
Start: 2024-07-10 | End: 2024-07-12

## 2024-07-10 RX ORDER — INSULIN LISPRO 100 [IU]/ML
INJECTION, SOLUTION SUBCUTANEOUS AT BEDTIME
Refills: 0 | Status: DISCONTINUED | OUTPATIENT
Start: 2024-07-10 | End: 2024-07-12

## 2024-07-10 RX ORDER — DEXTROSE 30 % IN WATER 30 %
15 VIAL (ML) INTRAVENOUS ONCE
Refills: 0 | Status: DISCONTINUED | OUTPATIENT
Start: 2024-07-10 | End: 2024-07-12

## 2024-07-10 RX ORDER — ONDANSETRON HYDROCHLORIDE 2 MG/ML
4 INJECTION INTRAMUSCULAR; INTRAVENOUS EVERY 8 HOURS
Refills: 0 | Status: DISCONTINUED | OUTPATIENT
Start: 2024-07-10 | End: 2024-07-12

## 2024-07-10 RX ORDER — ASPIRIN 325 MG/1
81 TABLET, FILM COATED ORAL DAILY
Refills: 0 | Status: DISCONTINUED | OUTPATIENT
Start: 2024-07-10 | End: 2024-07-12

## 2024-07-10 RX ORDER — ACETAMINOPHEN 325 MG
650 TABLET ORAL EVERY 6 HOURS
Refills: 0 | Status: DISCONTINUED | OUTPATIENT
Start: 2024-07-10 | End: 2024-07-12

## 2024-07-10 RX ORDER — DEXTROSE 30 % IN WATER 30 %
12.5 VIAL (ML) INTRAVENOUS ONCE
Refills: 0 | Status: DISCONTINUED | OUTPATIENT
Start: 2024-07-10 | End: 2024-07-12

## 2024-07-10 RX ORDER — INSULIN LISPRO 100 [IU]/ML
INJECTION, SOLUTION SUBCUTANEOUS
Refills: 0 | Status: DISCONTINUED | OUTPATIENT
Start: 2024-07-10 | End: 2024-07-12

## 2024-07-10 RX ORDER — CLONIDINE HYDROCHLORIDE 0.3 MG/1
0.2 TABLET ORAL AT BEDTIME
Refills: 0 | Status: DISCONTINUED | OUTPATIENT
Start: 2024-07-10 | End: 2024-07-12

## 2024-07-10 RX ADMIN — ATORVASTATIN CALCIUM 40 MILLIGRAM(S): 20 TABLET, FILM COATED ORAL at 22:07

## 2024-07-10 RX ADMIN — INSULIN LISPRO 3: 100 INJECTION, SOLUTION SUBCUTANEOUS at 18:08

## 2024-07-10 RX ADMIN — INSULIN LISPRO 2: 100 INJECTION, SOLUTION SUBCUTANEOUS at 23:02

## 2024-07-10 RX ADMIN — ASPIRIN 81 MILLIGRAM(S): 325 TABLET, FILM COATED ORAL at 11:24

## 2024-07-10 RX ADMIN — TAMSULOSIN HYDROCHLORIDE 0.4 MILLIGRAM(S): 0.4 CAPSULE ORAL at 22:07

## 2024-07-10 RX ADMIN — HEPARIN SODIUM 5000 UNIT(S): 50 INJECTION, SOLUTION INTRAVENOUS at 06:05

## 2024-07-10 RX ADMIN — CLONIDINE HYDROCHLORIDE 0.2 MILLIGRAM(S): 0.3 TABLET ORAL at 22:07

## 2024-07-10 RX ADMIN — INSULIN GLARGINE 10 UNIT(S): 100 INJECTION, SOLUTION SUBCUTANEOUS at 23:02

## 2024-07-10 RX ADMIN — INSULIN LISPRO 3: 100 INJECTION, SOLUTION SUBCUTANEOUS at 12:58

## 2024-07-10 NOTE — CONSULT NOTE ADULT - SUBJECTIVE AND OBJECTIVE BOX
Patient seen and evaluated at bedside    Chief Complaint: palpitations, dizziness    HPI:  73 y/o M with pmhx of prostate Ca, HTN, DM2 presented to the ED for new onset palpitations and dizziness. Symptoms have been happening for 2 months, however had gotten worse in the last 2 weeks. The patient reported symptoms occur only with exertion like walking up and down the stairs. He feels palpitations then becomes lightheaded. Denies LOC. Denies chest pain. Denies shortness of breath at rest. ROS otherwise negative. The patient denies hx of cardiac disease. Denies tobacco use. Denies fevers. Palpitations gets better with rest.    The episode that brought him to the hospital was on 7/9, working out at the gym and felt OK working out, then went to the sauna, and then the pool to swim for 15 minutes. After getting out from the pool he felt very dizzy and had to lay near the pool for ~10 minutes before he could get up.    EP consulted for palpitations..    EKG shows NSR 70s with RBBB.  Tele shows NSR 50s-60s with APCs.  He takes Toprol 50mg daily      PMHx:   HTN - Hypertension  Diabetes Mellitus Type II (ICD9 250.00)  Prostate cancer    PSHx:   Chest Pain with Normal Angiography    Allergies:  Cipro (Hives)    Current Medications:   acetaminophen     Tablet .. 650 milliGRAM(s) Oral every 6 hours PRN  aluminum hydroxide/magnesium hydroxide/simethicone Suspension 30 milliLiter(s) Oral every 4 hours PRN  aspirin enteric coated 81 milliGRAM(s) Oral daily  atorvastatin 40 milliGRAM(s) Oral at bedtime  cloNIDine 0.2 milliGRAM(s) Oral at bedtime  dextrose 5%. 1000 milliLiter(s) IV Continuous <Continuous>  dextrose 5%. 1000 milliLiter(s) IV Continuous <Continuous>  dextrose 50% Injectable 25 Gram(s) IV Push once  dextrose 50% Injectable 25 Gram(s) IV Push once  dextrose 50% Injectable 12.5 Gram(s) IV Push once  dextrose Oral Gel 15 Gram(s) Oral once PRN  glucagon  Injectable 1 milliGRAM(s) IntraMuscular once  heparin   Injectable 5000 Unit(s) SubCutaneous every 12 hours  insulin glargine Injectable (LANTUS) 10 Unit(s) SubCutaneous at bedtime  insulin lispro (ADMELOG) corrective regimen sliding scale   SubCutaneous three times a day before meals  insulin lispro (ADMELOG) corrective regimen sliding scale   SubCutaneous at bedtime  melatonin 3 milliGRAM(s) Oral at bedtime PRN  ondansetron Injectable 4 milliGRAM(s) IV Push every 8 hours PRN  tamsulosin 0.4 milliGRAM(s) Oral at bedtime      FAMILY HISTORY:  No pertinent family history in first degree relatives    REVIEW OF SYSTEMS:  All other review of systems is negative unless indicated above.    Physical Exam:  T(F): 97.7 (07-10), Max: 98.5 (07-09)  HR: 60 (07-10) (60 - 65)  BP: 167/58 (07-10) (148/84 - 174/60)  RR: 18 (07-10)  SpO2: 100% (07-10)  GENERAL: No acute distress, well-developed, overweight  CHEST/LUNG: Clear to auscultation bilaterally; No wheeze, equal breath sounds bilaterally   HEART: Regular rate and rhythm; No murmurs, rubs, or gallops  ABDOMEN: Soft, Nontender, Nondistended  EXTREMITIES:  No edema  NEUROLOGY: AAOx3    CXR: Personally reviewed    Labs: Personally reviewed                        12.2   5.62  )-----------( 255      ( 10 Jul 2024 06:20 )             36.5     07-10    143  |  103  |  26<H>  ----------------------------<  67<L>  4.0   |  25  |  1.21    Ca    10.1      10 Jul 2024 06:20  Phos  3.8     07-10  Mg     2.00     07-10    TPro  7.7  /  Alb  4.0  /  TBili  0.6  /  DBili  x   /  AST  16  /  ALT  16  /  AlkPhos  92  07-10    PT/INR - ( 09 Jul 2024 14:07 )   PT: 9.5 sec;   INR: <0.90 ratio         PTT - ( 09 Jul 2024 14:07 )  PTT:31.0 sec    CARDIAC MARKERS ( 09 Jul 2024 17:30 )  19 ng/L / x     / x     / x     / x     / x      CARDIAC MARKERS ( 09 Jul 2024 14:07 )  21 ng/L / x     / x     / x     / x     / x            Thyroid Stimulating Hormone, Serum: 0.61 uIU/mL (07-09 @ 14:13)

## 2024-07-10 NOTE — CONSULT NOTE ADULT - ASSESSMENT
75 y/o M with pmhx of prostate Ca, HTN, DM2 presented to the ED for new onset palpitations and dizziness. Symptoms have been happening for 2 months, however had gotten worse in the last 2 weeks. The patient reported symptoms occur only with exertion like walking up and down the stairs. He feels palpitations then becomes lightheaded. Denies LOC. Denies chest pain. Denies shortness of breath at rest. ROS otherwise negative. The patient denies hx of cardiac disease. Denies tobacco use. Denies fevers. Palpitations gets better with rest.    The episode that brought him to the hospital was on 7/9, working out at the gym and felt OK working out, then went to the sauna, and then the pool to swim for 15 minutes. After getting out from the pool he felt very dizzy and had to lay near the pool for ~10 minutes before he could get up.    EP consulted for palpitations..    EKG shows NSR 70s with RBBB.  Tele shows NSR 50s-60s with APCs.  He takes Toprol 50mg daily.    No prior TTE in system.    Impression/Recommendations:  - Patient's dizziness episode that brought him to the hospital sounds either vagal or orthostatic based on symptoms immediately coming out of a pool. Chronic palpitations with exercise could also be orthostatic or vagal and suspect less likely arrhythmogenic  - C/w beta-blocker  - TTE  - Tele while in-house  - 2-week ZioPatch at discharge    Note incomplete until cosigned by attending.    Donaldo Simms, PGY-5  Cardiology Fellow    For all new consults  [email or web address]  Login: [email or web address]

## 2024-07-10 NOTE — H&P ADULT - NSHPREVIEWOFSYSTEMS_GEN_ALL_CORE
REVIEW OF SYSTEMS:    CONSTITUTIONAL: No weakness, fevers or chills  EYES/ENT: No visual changes;  No dysphagia; No sore throat; No rhinorrhea; No sinus pain/pressure  NECK: No pain or stiffness  RESPIRATORY: No cough, wheezing, hemoptysis; No shortness of breath  CARDIOVASCULAR: No chest pain, + palpitations; No lower extremity edema, + dizziness  GASTROINTESTINAL: No abdominal or epigastric pain. No nausea, vomiting, or hematemesis; No diarrhea or constipation. No melena or hematochezia.  GENITOURINARY: No dysuria, frequency or hematuria  NEUROLOGICAL: No numbness or weakness  MSK: ambulates without assistance  SKIN: No itching, burning, rashes, or lesions   All other review of systems is negative unless indicated above.

## 2024-07-10 NOTE — H&P ADULT - PROBLEM SELECTOR PLAN 1
- patient presented for new onset palpitations  - unclear etiology at this time, EKG shows NSR RBBB  - EP consult in the AM - may benefit from a loop recorder - day team to follow  - tele monitoring  - hold BP meds for now  - orthostatics pending

## 2024-07-10 NOTE — H&P ADULT - HISTORY OF PRESENT ILLNESS
73 y/o M with pmhx of prostate Ca, HTN, DM2 presented to the ED for new onset palpitations and dizziness. Symptoms have been happening for 2 months, however had gotten worse in the last 2 weeks. The patient reported symptoms occur only with exertion like walking up and down the stairs. He feels palpitations then becomes lightheaded. Denies LOC. Denies chest pain. Denies shortness of breath at rest. ROS otherwise negative. The patient denies hx of cardiac disease. Denies tobacco use. Denies fevers. Palpitations gets better with rest.

## 2024-07-10 NOTE — H&P ADULT - PROBLEM SELECTOR PLAN 6
- patient provided list however when cross checked with [facility] pharmacy rec that was done, clonidine and hctz was not on his list, patient's list is likely old

## 2024-07-10 NOTE — H&P ADULT - ASSESSMENT
73 y/o M with pmhx of prostate Ca, HTN, DM2 presented to the ED for new onset palpitations and dizziness. Unclear etiology at this time. Patient will need EP eval for arrythmia.

## 2024-07-10 NOTE — H&P ADULT - NSHPPHYSICALEXAM_GEN_ALL_CORE
PHYSICAL EXAM:  VITALS: Vital Signs Last 24 Hrs  T(C): 36.6 (10 Jul 2024 00:31), Max: 37.1 (09 Jul 2024 11:42)  T(F): 97.9 (10 Jul 2024 00:31), Max: 98.8 (09 Jul 2024 11:42)  HR: 65 (10 Jul 2024 00:31) (62 - 79)  BP: 174/60 (10 Jul 2024 00:31) (148/84 - 175/65)  BP(mean): 79 (09 Jul 2024 21:20) (79 - 79)  RR: 18 (10 Jul 2024 00:31) (16 - 18)  SpO2: 99% (10 Jul 2024 00:31) (98% - 100%)    Parameters below as of 10 Jul 2024 00:31  Patient On (Oxygen Delivery Method): room air      GENERAL: NAD, comfortable at bedside  HEAD:  Atraumatic, Normocephalic  EYES: EOMI, PERRL, conjunctiva and sclera clear  ENT: Moist Mucus Membranes present, no ulcers appreciated  NECK: Supple, No JVD  CHEST/LUNG: Clear to auscultation bilaterally; No wheezes, rales or rhonchi, no accessory muscle use  HEART: Regular rate and rhythm; No murmurs, rubs, or gallops, (+)S1, S2  ABDOMEN: Soft, Nontender, Nondistended; Normal Bowel sounds   EXTREMITIES: No clubbing, cyanosis, mild pitting edema in LE b/l  PSYCH: normal mood and affect  NEUROLOGY: AAOx3, non-focal  SKIN: No rashes or lesions

## 2024-07-10 NOTE — CONSULT NOTE ADULT - ATTENDING COMMENTS
73 y/o M with pmhx of prostate Ca, HTN, DM2 presented to the ED for new onset palpitations and dizziness. Symptoms have been happening for 2 months, however had gotten worse in the last 2 weeks. The patient reported symptoms occur only with exertion like walking up and down the stairs. He feels palpitations then becomes lightheaded. Denies LOC. Denies chest pain. Denies shortness of breath at rest. PACs on tele. Obtain echo. Tele monitoring. If all negative, will send home with an event monitor.

## 2024-07-10 NOTE — H&P ADULT - NSHPLABSRESULTS_GEN_ALL_CORE
11.7   6.19  )-----------( 239      ( 09 Jul 2024 14:07 )             35.9       07-09    143  |  104  |  32<H>  ----------------------------<  112<H>  4.8   |  24  |  1.70<H>    Ca    10.1      09 Jul 2024 14:07  Mg     2.10     07-09    TPro  7.4  /  Alb  3.9  /  TBili  0.3  /  DBili  x   /  AST  18  /  ALT  18  /  AlkPhos  98  07-09            PT/INR - ( 09 Jul 2024 14:07 )   PT: 9.5 sec;   INR: <0.90 ratio         PTT - ( 09 Jul 2024 14:07 )  PTT:31.0 sec        Urinalysis Basic - ( 09 Jul 2024 14:07 )    Color: x / Appearance: x / SG: x / pH: x  Gluc: 112 mg/dL / Ketone: x  / Bili: x / Urobili: x   Blood: x / Protein: x / Nitrite: x   Leuk Esterase: x / RBC: x / WBC x   Sq Epi: x / Non Sq Epi: x / Bacteria: x            CXR: As per my read - no opacities noted, Will wait for prelim/official read    EKG: As per my read - NSR RBBB QTc 471 ms    CT:  IMPRESSION: Limited PE study for evaluation of some of the subsegmental   pulmonary arterial branches due to respiratory motion artifact. No   pulmonary arterial emboli within the other well visualized pulmonary   arteries.    Indeterminate partially imaged 1.5 cm hepatic hypodense lesion.   Nonemergent contrast enhanced liver MRI is recommended for complete   evaluation.

## 2024-07-10 NOTE — H&P ADULT - PROBLEM SELECTOR PLAN 2
- currently Cr 1.7  - unclear etiology of ANIBAL at this time, likely from ACEi use  - will hold ACEi for now  - urine lytes sent  - nephrology consult in the AM

## 2024-07-10 NOTE — H&P ADULT - PROBLEM/PLAN-1
----- Message from Bhavna Gil RN sent at 4/25/2022  9:17 AM CDT -----  Patient stated he saw portal message and understands the message.  Patient informed of labs.  He will discuss work restrictions at appointment. Thanks, Bhavna BEJARANO  ----- Message -----  From: Alicia Arreola MD  Sent: 4/23/2022  11:45 AM CDT  To: Bhavna Gil RN, #    Please call patient and discuss. Work note in portal  May return with restrictions and monitoring closely of symptoms  If further paperwork (FMLA), follow up advised.  Mr. Lomax,   I discussed with Dr. Hayes cardiologistIt will be okay to return to work with close monitoring of symptoms. I am aware he ordered further testing. He advises to limit lifting. Do not lift more than 50lbs at once. If you have symptoms worse with exerting yourself, then it would be advised to NOT work and wait until the further testing completed (coronary CT scan). I would advise starting work and seeing how things go. Follow up if further documentation needed or questions or concerns or would like to discuss. Work note in your portal.  Labs as follows  Normal blood sugars. No diabetes, no prediabetes  Blood counts all normal  Dehydration that day with fasting. Important to drink 8 glasses of 8 ounces of water a day for 64 ounces.   Liver testing normal  Cholesterol has improved on medication. Continue current dosing. Make sure to take every day. We can recheck in 3 months  Thyroid normal   Work note in portal  Nurse will callThank you  Dr. Arreola        DISPLAY PLAN FREE TEXT

## 2024-07-11 ENCOUNTER — RESULT REVIEW (OUTPATIENT)
Age: 74
End: 2024-07-11

## 2024-07-11 LAB
ANION GAP SERPL CALC-SCNC: 11 MMOL/L — SIGNIFICANT CHANGE UP (ref 7–14)
BILIRUB SERPL-MCNC: 0.5 MG/DL — SIGNIFICANT CHANGE UP (ref 0.2–1.2)
BUN SERPL-MCNC: 20 MG/DL — SIGNIFICANT CHANGE UP (ref 7–23)
CALCIUM SERPL-MCNC: 10.1 MG/DL — SIGNIFICANT CHANGE UP (ref 8.4–10.5)
CHLORIDE SERPL-SCNC: 101 MMOL/L — SIGNIFICANT CHANGE UP (ref 98–107)
CO2 SERPL-SCNC: 26 MMOL/L — SIGNIFICANT CHANGE UP (ref 22–31)
CREAT SERPL-MCNC: 0.99 MG/DL — SIGNIFICANT CHANGE UP (ref 0.5–1.3)
CREAT SERPL-MCNC: 0.99 MG/DL — SIGNIFICANT CHANGE UP (ref 0.5–1.3)
EGFR: 80 ML/MIN/1.73M2 — SIGNIFICANT CHANGE UP
EGFR: 80 ML/MIN/1.73M2 — SIGNIFICANT CHANGE UP
GLUCOSE BLDC GLUCOMTR-MCNC: 230 MG/DL — HIGH (ref 70–99)
GLUCOSE BLDC GLUCOMTR-MCNC: 248 MG/DL — HIGH (ref 70–99)
GLUCOSE BLDC GLUCOMTR-MCNC: 254 MG/DL — HIGH (ref 70–99)
GLUCOSE BLDC GLUCOMTR-MCNC: 267 MG/DL — HIGH (ref 70–99)
GLUCOSE BLDC GLUCOMTR-MCNC: 344 MG/DL — HIGH (ref 70–99)
GLUCOSE SERPL-MCNC: 178 MG/DL — HIGH (ref 70–99)
HCT VFR BLD CALC: 37 % — LOW (ref 39–50)
HGB BLD-MCNC: 12.3 G/DL — LOW (ref 13–17)
INR BLD: 1 RATIO — SIGNIFICANT CHANGE UP (ref 0.85–1.18)
MCHC RBC-ENTMCNC: 31.9 PG — SIGNIFICANT CHANGE UP (ref 27–34)
MCHC RBC-ENTMCNC: 33.2 GM/DL — SIGNIFICANT CHANGE UP (ref 32–36)
MCV RBC AUTO: 95.9 FL — SIGNIFICANT CHANGE UP (ref 80–100)
MELD SCORE WITH DIALYSIS: 20 POINTS — SIGNIFICANT CHANGE UP
MELD SCORE WITHOUT DIALYSIS: 6 POINTS — SIGNIFICANT CHANGE UP
NRBC # BLD: 0 /100 WBCS — SIGNIFICANT CHANGE UP (ref 0–0)
NRBC # FLD: 0 K/UL — SIGNIFICANT CHANGE UP (ref 0–0)
PLATELET # BLD AUTO: 252 K/UL — SIGNIFICANT CHANGE UP (ref 150–400)
POTASSIUM SERPL-MCNC: 3.7 MMOL/L — SIGNIFICANT CHANGE UP (ref 3.5–5.3)
POTASSIUM SERPL-SCNC: 3.7 MMOL/L — SIGNIFICANT CHANGE UP (ref 3.5–5.3)
PROTHROM AB SERPL-ACNC: 11.3 SEC — SIGNIFICANT CHANGE UP (ref 9.5–13)
RBC # BLD: 3.86 M/UL — LOW (ref 4.2–5.8)
RBC # FLD: 15.5 % — HIGH (ref 10.3–14.5)
SODIUM SERPL-SCNC: 137 MMOL/L — SIGNIFICANT CHANGE UP (ref 135–145)
SODIUM SERPL-SCNC: 138 MMOL/L — SIGNIFICANT CHANGE UP (ref 135–145)
WBC # BLD: 3.55 K/UL — LOW (ref 3.8–10.5)
WBC # FLD AUTO: 3.55 K/UL — LOW (ref 3.8–10.5)

## 2024-07-11 PROCEDURE — 99232 SBSQ HOSP IP/OBS MODERATE 35: CPT

## 2024-07-11 PROCEDURE — 93306 TTE W/DOPPLER COMPLETE: CPT | Mod: 26

## 2024-07-11 RX ADMIN — INSULIN LISPRO 1: 100 INJECTION, SOLUTION SUBCUTANEOUS at 22:47

## 2024-07-11 RX ADMIN — INSULIN GLARGINE 10 UNIT(S): 100 INJECTION, SOLUTION SUBCUTANEOUS at 22:39

## 2024-07-11 RX ADMIN — CLONIDINE HYDROCHLORIDE 0.2 MILLIGRAM(S): 0.3 TABLET ORAL at 21:38

## 2024-07-11 RX ADMIN — INSULIN LISPRO 3: 100 INJECTION, SOLUTION SUBCUTANEOUS at 17:40

## 2024-07-11 RX ADMIN — INSULIN LISPRO 2: 100 INJECTION, SOLUTION SUBCUTANEOUS at 13:03

## 2024-07-11 RX ADMIN — INSULIN LISPRO 2: 100 INJECTION, SOLUTION SUBCUTANEOUS at 09:34

## 2024-07-11 RX ADMIN — TAMSULOSIN HYDROCHLORIDE 0.4 MILLIGRAM(S): 0.4 CAPSULE ORAL at 21:38

## 2024-07-11 RX ADMIN — HEPARIN SODIUM 5000 UNIT(S): 50 INJECTION, SOLUTION INTRAVENOUS at 06:17

## 2024-07-11 RX ADMIN — ASPIRIN 81 MILLIGRAM(S): 325 TABLET, FILM COATED ORAL at 12:04

## 2024-07-11 RX ADMIN — ATORVASTATIN CALCIUM 40 MILLIGRAM(S): 20 TABLET, FILM COATED ORAL at 21:38

## 2024-07-12 ENCOUNTER — TRANSCRIPTION ENCOUNTER (OUTPATIENT)
Age: 74
End: 2024-07-12

## 2024-07-12 VITALS
TEMPERATURE: 98 F | HEART RATE: 60 BPM | SYSTOLIC BLOOD PRESSURE: 149 MMHG | OXYGEN SATURATION: 100 % | RESPIRATION RATE: 17 BRPM | DIASTOLIC BLOOD PRESSURE: 67 MMHG

## 2024-07-12 LAB
ANION GAP SERPL CALC-SCNC: 16 MMOL/L — HIGH (ref 7–14)
BUN SERPL-MCNC: 16 MG/DL — SIGNIFICANT CHANGE UP (ref 7–23)
CALCIUM SERPL-MCNC: 10.3 MG/DL — SIGNIFICANT CHANGE UP (ref 8.4–10.5)
CHLORIDE SERPL-SCNC: 101 MMOL/L — SIGNIFICANT CHANGE UP (ref 98–107)
CO2 SERPL-SCNC: 23 MMOL/L — SIGNIFICANT CHANGE UP (ref 22–31)
CREAT SERPL-MCNC: 0.98 MG/DL — SIGNIFICANT CHANGE UP (ref 0.5–1.3)
EGFR: 81 ML/MIN/1.73M2 — SIGNIFICANT CHANGE UP
GLUCOSE BLDC GLUCOMTR-MCNC: 201 MG/DL — HIGH (ref 70–99)
GLUCOSE BLDC GLUCOMTR-MCNC: 201 MG/DL — HIGH (ref 70–99)
GLUCOSE SERPL-MCNC: 182 MG/DL — HIGH (ref 70–99)
HCT VFR BLD CALC: 37.5 % — LOW (ref 39–50)
HGB BLD-MCNC: 12.8 G/DL — LOW (ref 13–17)
MAGNESIUM SERPL-MCNC: 1.7 MG/DL — SIGNIFICANT CHANGE UP (ref 1.6–2.6)
MCHC RBC-ENTMCNC: 32.2 PG — SIGNIFICANT CHANGE UP (ref 27–34)
MCHC RBC-ENTMCNC: 34.1 GM/DL — SIGNIFICANT CHANGE UP (ref 32–36)
MCV RBC AUTO: 94.5 FL — SIGNIFICANT CHANGE UP (ref 80–100)
NRBC # BLD: 0 /100 WBCS — SIGNIFICANT CHANGE UP (ref 0–0)
NRBC # FLD: 0 K/UL — SIGNIFICANT CHANGE UP (ref 0–0)
PHOSPHATE SERPL-MCNC: 3.3 MG/DL — SIGNIFICANT CHANGE UP (ref 2.5–4.5)
PLATELET # BLD AUTO: 240 K/UL — SIGNIFICANT CHANGE UP (ref 150–400)
POTASSIUM SERPL-MCNC: 3.6 MMOL/L — SIGNIFICANT CHANGE UP (ref 3.5–5.3)
POTASSIUM SERPL-SCNC: 3.6 MMOL/L — SIGNIFICANT CHANGE UP (ref 3.5–5.3)
RBC # BLD: 3.97 M/UL — LOW (ref 4.2–5.8)
RBC # FLD: 15 % — HIGH (ref 10.3–14.5)
SODIUM SERPL-SCNC: 140 MMOL/L — SIGNIFICANT CHANGE UP (ref 135–145)
WBC # BLD: 3.85 K/UL — SIGNIFICANT CHANGE UP (ref 3.8–10.5)
WBC # FLD AUTO: 3.85 K/UL — SIGNIFICANT CHANGE UP (ref 3.8–10.5)

## 2024-07-12 PROCEDURE — 99231 SBSQ HOSP IP/OBS SF/LOW 25: CPT

## 2024-07-12 RX ORDER — METOPROLOL TARTRATE 50 MG
50 TABLET ORAL DAILY
Refills: 0 | Status: DISCONTINUED | OUTPATIENT
Start: 2024-07-12 | End: 2024-07-12

## 2024-07-12 RX ORDER — CHLORTHALIDONE 25 MG/1
1 TABLET ORAL
Refills: 0 | DISCHARGE

## 2024-07-12 RX ADMIN — ASPIRIN 81 MILLIGRAM(S): 325 TABLET, FILM COATED ORAL at 12:34

## 2024-07-12 RX ADMIN — HEPARIN SODIUM 5000 UNIT(S): 50 INJECTION, SOLUTION INTRAVENOUS at 05:19

## 2024-07-12 RX ADMIN — INSULIN LISPRO 2: 100 INJECTION, SOLUTION SUBCUTANEOUS at 12:37

## 2024-07-12 RX ADMIN — INSULIN LISPRO 2: 100 INJECTION, SOLUTION SUBCUTANEOUS at 08:45

## 2024-07-12 RX ADMIN — Medication 50 MILLIGRAM(S): at 12:39

## 2024-07-12 NOTE — DISCHARGE NOTE PROVIDER - NSDCCPCAREPLAN_GEN_ALL_CORE_FT
PRINCIPAL DISCHARGE DIAGNOSIS  Diagnosis: Palpitations  Assessment and Plan of Treatment: You presented with palpitations with unclear etiology as this time. You were placed on telemetry and your rhythm was on the lower to normal side. You were seen by the electrophysiologist and they recommended you to contniue with metoprolol as you were taking at home and get zio patch on discharge. Please continue to follow up with outpatient electrophysiologist for further management.      SECONDARY DISCHARGE DIAGNOSES  Diagnosis: Liver lesion  Assessment and Plan of Treatment: On CT scan of the lung, there was an incidental finding of liver lesion which should be further evaluated with MRI. Please follow up with prmiary care provider within 1 week of discharge to schedule for outpatient MRI.    Diagnosis: DM2 (diabetes mellitus, type 2)  Assessment and Plan of Treatment: You were placed on lantus 10units at bedtime here which is a lot lower then what you take at home. Please measure your blood sugar prior to injection the insulin and adjusts the dose with primary care provider as necessary. Would suggest to go back up more gradually.    Diagnosis: ANIBAL (acute kidney injury)  Assessment and Plan of Treatment: When you first came in your kidney numbers were elevated. You were monitored off the bloodpressure medication and your numbers came back down to normal. Please follow up with primary care provider within 1 week of discharge to repeat the number. If necessary your blood pressure medication may have to be changed to a different regimen.     PRINCIPAL DISCHARGE DIAGNOSIS  Diagnosis: Palpitations  Assessment and Plan of Treatment: You presented with palpitations with unclear etiology as this time. You were placed on telemetry and your rhythm was on the lower to normal side. You were seen by the electrophysiologist and they recommended you to contniue with metoprolol as you were taking at home and get zio patch on discharge. Please continue to follow up with outpatient electrophysiologist for further management. Given that you had bleeding at the site of ablation, please hold eliquis for 2 more days and resume on 7/14      SECONDARY DISCHARGE DIAGNOSES  Diagnosis: Liver lesion  Assessment and Plan of Treatment: On CT scan of the lung, there was an incidental finding of liver lesion which should be further evaluated with MRI. Please follow up with prmiary care provider within 1 week of discharge to schedule for outpatient MRI.    Diagnosis: DM2 (diabetes mellitus, type 2)  Assessment and Plan of Treatment: You were placed on lantus 10units at bedtime here which is a lot lower then what you take at home. Please measure your blood sugar prior to injection the insulin and adjusts the dose with primary care provider as necessary. Would suggest to go back up more gradually.    Diagnosis: ANIBAL (acute kidney injury)  Assessment and Plan of Treatment: When you first came in your kidney numbers were elevated. You were monitored off the bloodpressure medication and your numbers came back down to normal. Please follow up with primary care provider within 1 week of discharge to repeat the number. If necessary your blood pressure medication may have to be changed to a different regimen.     PRINCIPAL DISCHARGE DIAGNOSIS  Diagnosis: Palpitations  Assessment and Plan of Treatment: You presented with palpitations with unclear etiology as this time. You were placed on telemetry and your rhythm was on the lower to normal side. You were seen by the electrophysiologist and they recommended you to contniue with metoprolol as you were taking at home and get zio patch on discharge. Please continue to follow up with outpatient electrophysiologist for further management. Given that you had bleeding at the site of ablation, please hold eliquis for 2 more days and resume on 7/14      SECONDARY DISCHARGE DIAGNOSES  Diagnosis: Liver lesion  Assessment and Plan of Treatment: On CT scan of the lung, there was an incidental finding of liver lesion which should be further evaluated with MRI. Please follow up with prmiary care provider within 1 week of discharge to schedule for outpatient MRI.    Diagnosis: DM2 (diabetes mellitus, type 2)  Assessment and Plan of Treatment: You were placed on lantus 10units at bedtime here which is a lot lower then what you take at home. Please measure your blood sugar prior to injection the insulin and adjusts the dose with primary care provider as necessary. Would suggest to go back up more gradually.    Diagnosis: Benign essential HTN  Assessment and Plan of Treatment: Your chlorthalidone is held at this time but lisinopril was resumed. Please follow up with outpatient primary care provider for further management.    Diagnosis: ANIBAL (acute kidney injury)  Assessment and Plan of Treatment: When you first came in your kidney numbers were elevated. You were monitored off the bloodpressure medication and your numbers came back down to normal. Please follow up with primary care provider within 1 week of discharge to repeat the number. If necessary your blood pressure medication may have to be changed to a different regimen.

## 2024-07-12 NOTE — PROGRESS NOTE ADULT - SUBJECTIVE AND OBJECTIVE BOX
Date of Service  : 07-11-24   INTERVAL HPI/OVERNIGHT EVENTS: I feel fine.   Vital Signs Last 24 Hrs  T(C): 36.8 (11 Jul 2024 15:28), Max: 36.8 (10 Jul 2024 21:38)  T(F): 98.3 (11 Jul 2024 15:28), Max: 98.3 (11 Jul 2024 15:28)  HR: 59 (11 Jul 2024 15:28) (43 - 64)  BP: 148/53 (11 Jul 2024 15:28) (141/58 - 161/80)  BP(mean): --  RR: 17 (11 Jul 2024 15:28) (16 - 18)  SpO2: 100% (11 Jul 2024 15:28) (97% - 100%)    Parameters below as of 11 Jul 2024 15:28  Patient On (Oxygen Delivery Method): room air      I&O's Summary    MEDICATIONS  (STANDING):  aspirin enteric coated 81 milliGRAM(s) Oral daily  atorvastatin 40 milliGRAM(s) Oral at bedtime  cloNIDine 0.2 milliGRAM(s) Oral at bedtime  dextrose 5%. 1000 milliLiter(s) (50 mL/Hr) IV Continuous <Continuous>  dextrose 5%. 1000 milliLiter(s) (100 mL/Hr) IV Continuous <Continuous>  dextrose 50% Injectable 12.5 Gram(s) IV Push once  dextrose 50% Injectable 25 Gram(s) IV Push once  dextrose 50% Injectable 25 Gram(s) IV Push once  glucagon  Injectable 1 milliGRAM(s) IntraMuscular once  heparin   Injectable 5000 Unit(s) SubCutaneous every 12 hours  insulin glargine Injectable (LANTUS) 10 Unit(s) SubCutaneous at bedtime  insulin lispro (ADMELOG) corrective regimen sliding scale   SubCutaneous at bedtime  insulin lispro (ADMELOG) corrective regimen sliding scale   SubCutaneous three times a day before meals  tamsulosin 0.4 milliGRAM(s) Oral at bedtime    MEDICATIONS  (PRN):  acetaminophen     Tablet .. 650 milliGRAM(s) Oral every 6 hours PRN Temp greater or equal to 38C (100.4F), Mild Pain (1 - 3)  aluminum hydroxide/magnesium hydroxide/simethicone Suspension 30 milliLiter(s) Oral every 4 hours PRN Dyspepsia  dextrose Oral Gel 15 Gram(s) Oral once PRN Blood Glucose LESS THAN 70 milliGRAM(s)/deciliter  melatonin 3 milliGRAM(s) Oral at bedtime PRN Insomnia  ondansetron Injectable 4 milliGRAM(s) IV Push every 8 hours PRN Nausea and/or Vomiting    LABS:                        12.3   3.55  )-----------( 252      ( 11 Jul 2024 05:11 )             37.0     07-11    138  |  101  |  20  ----------------------------<  178<H>  3.7   |  26  |  0.99    Ca    10.1      11 Jul 2024 05:11  Phos  3.8     07-10  Mg     2.00     07-10    TPro  x   /  Alb  x   /  TBili  0.5  /  DBili  x   /  AST  x   /  ALT  x   /  AlkPhos  x   07-11    PT/INR - ( 11 Jul 2024 05:11 )   PT: 11.3 sec;   INR: 1.00 ratio           Urinalysis Basic - ( 11 Jul 2024 05:11 )    Color: x / Appearance: x / SG: x / pH: x  Gluc: 178 mg/dL / Ketone: x  / Bili: x / Urobili: x   Blood: x / Protein: x / Nitrite: x   Leuk Esterase: x / RBC: x / WBC x   Sq Epi: x / Non Sq Epi: x / Bacteria: x      CAPILLARY BLOOD GLUCOSE      POCT Blood Glucose.: 248 mg/dL (11 Jul 2024 12:28)  POCT Blood Glucose.: 230 mg/dL (11 Jul 2024 09:12)  POCT Blood Glucose.: 309 mg/dL (10 Jul 2024 22:15)  POCT Blood Glucose.: 305 mg/dL (10 Jul 2024 22:11)  POCT Blood Glucose.: 252 mg/dL (10 Jul 2024 18:02)        Urinalysis Basic - ( 11 Jul 2024 05:11 )    Color: x / Appearance: x / SG: x / pH: x  Gluc: 178 mg/dL / Ketone: x  / Bili: x / Urobili: x   Blood: x / Protein: x / Nitrite: x   Leuk Esterase: x / RBC: x / WBC x   Sq Epi: x / Non Sq Epi: x / Bacteria: x      REVIEW OF SYSTEMS:  CONSTITUTIONAL: No fever, weight loss, or fatigue  EYES: No eye pain, visual disturbances, or discharge  ENMT:  No difficulty hearing, tinnitus, vertigo; No sinus or throat pain  NECK: No pain or stiffness  RESPIRATORY: No cough, wheezing, chills or hemoptysis; No shortness of breath  CARDIOVASCULAR: No chest pain, palpitations, dizziness, or leg swelling  GASTROINTESTINAL: No abdominal or epigastric pain. No nausea, vomiting, or hematemesis; No diarrhea or constipation. No melena or hematochezia.  GENITOURINARY: No dysuria, frequency, hematuria, or incontinence  NEUROLOGICAL: No headaches, memory loss, loss of strength, numbness, or tremors    Consultant(s) Notes Reviewed:  [x ] YES  [ ] NO    PHYSICAL EXAM:  GENERAL: NAD, well-groomed, well-developed,not in any distress ,  HEAD:  Atraumatic, Normocephalic  EYES: EOMI, PERRLA, conjunctiva and sclera clear  ENMT: No tonsillar erythema, exudates, or enlargement; Moist mucous membranes, Good dentition, No lesions  NECK: Supple, No JVD, Normal thyroid  NERVOUS SYSTEM:  Alert & Oriented X3, No focal deficit   CHEST/LUNG: Good air entry bilateral with no  rales, rhonchi, wheezing, or rubs  HEART: Regular rate and rhythm; No murmurs, rubs, or gallops  ABDOMEN: Soft, Nontender, Nondistended; Bowel sounds present  EXTREMITIES:  2+ Peripheral Pulses, No clubbing, cyanosis, or edema  SKIN: No rashes or lesions    Care Discussed with Consultants/Other Providers [ x] YES  [ ] NO
Interval History:  No acute events overnight  Telemetry: NSR with APCs    MEDICATIONS  (STANDING):  aspirin enteric coated 81 milliGRAM(s) Oral daily  atorvastatin 40 milliGRAM(s) Oral at bedtime  cloNIDine 0.2 milliGRAM(s) Oral at bedtime  dextrose 5%. 1000 milliLiter(s) (50 mL/Hr) IV Continuous <Continuous>  dextrose 5%. 1000 milliLiter(s) (100 mL/Hr) IV Continuous <Continuous>  dextrose 50% Injectable 12.5 Gram(s) IV Push once  dextrose 50% Injectable 25 Gram(s) IV Push once  dextrose 50% Injectable 25 Gram(s) IV Push once  glucagon  Injectable 1 milliGRAM(s) IntraMuscular once  heparin   Injectable 5000 Unit(s) SubCutaneous every 12 hours  insulin glargine Injectable (LANTUS) 10 Unit(s) SubCutaneous at bedtime  insulin lispro (ADMELOG) corrective regimen sliding scale   SubCutaneous at bedtime  insulin lispro (ADMELOG) corrective regimen sliding scale   SubCutaneous three times a day before meals  tamsulosin 0.4 milliGRAM(s) Oral at bedtime    MEDICATIONS  (PRN):  acetaminophen     Tablet .. 650 milliGRAM(s) Oral every 6 hours PRN Temp greater or equal to 38C (100.4F), Mild Pain (1 - 3)  aluminum hydroxide/magnesium hydroxide/simethicone Suspension 30 milliLiter(s) Oral every 4 hours PRN Dyspepsia  dextrose Oral Gel 15 Gram(s) Oral once PRN Blood Glucose LESS THAN 70 milliGRAM(s)/deciliter  melatonin 3 milliGRAM(s) Oral at bedtime PRN Insomnia  ondansetron Injectable 4 milliGRAM(s) IV Push every 8 hours PRN Nausea and/or Vomiting    Vital Signs Last 24 Hrs  T(C): 36.4 (07-11-24 @ 05:56), Max: 36.8 (07-10-24 @ 21:38)  T(F): 97.6 (07-11-24 @ 05:56), Max: 98.2 (07-10-24 @ 21:38)  HR: 55 (07-11-24 @ 05:56) (43 - 64)  BP: 144/55 (07-11-24 @ 05:56) (144/55 - 167/58)  BP(mean): --  RR: 18 (07-11-24 @ 05:56) (18 - 18)  SpO2: 100% (07-11-24 @ 05:56) (100% - 100%)    Orthostatic VS  07-10-24 @ 06:00  Lying BP: 163/53 HR: 64  Sitting BP: 167/60 HR: 64  Standing BP: 177/54 HR: 66  Site: upper right arm  Mode: electronic  Orthostatic VS  07-09-24 @ 22:30  Lying BP: 172/56 HR: 61  Sitting BP: 178/59 HR: 67  Standing BP: 181/87 HR: 61  Site: upper left arm  Mode: --    Appearance: Normal	  HEENT:   Normal oral mucosa, PERRL, EOMI	  Lymphatic: No lymphadenopathy  Cardiovascular: Normal S1 S2, No JVD, No murmurs, No edema  Respiratory: Lungs clear to auscultation	  Psychiatry: A & O x 3, Mood & affect appropriate  Gastrointestinal:  Soft, Non-tender, + BS	  Skin: No rashes, No ecchymoses, No cyanosis	  Neurologic: Non-focal  Extremities: Normal range of motion, No clubbing, cyanosis or edema  Vascular: Peripheral pulses palpable 2+ bilaterally    LABS:	 	    CBC Full  -  ( 11 Jul 2024 05:11 )  WBC Count : 3.55 K/uL  Hemoglobin : 12.3 g/dL  Hematocrit : 37.0 %  Platelet Count - Automated : 252 K/uL  Mean Cell Volume : 95.9 fL  Mean Cell Hemoglobin : 31.9 pg  Mean Cell Hemoglobin Concentration : 33.2 gm/dL  Auto Neutrophil # : x  Auto Lymphocyte # : x  Auto Monocyte # : x  Auto Eosinophil # : x  Auto Basophil # : x  Auto Neutrophil % : x  Auto Lymphocyte % : x  Auto Monocyte % : x  Auto Eosinophil % : x  Auto Basophil % : x    07-11    138  |  101  |  20  ----------------------------<  178<H>  3.7   |  26  |  0.99  07-10    143  |  103  |  26<H>  ----------------------------<  67<L>  4.0   |  25  |  1.21    Ca    10.1      11 Jul 2024 05:11  Ca    10.1      10 Jul 2024 06:20  Phos  3.8     07-10  Mg     2.00     07-10  Mg     2.10     07-09    TPro  x   /  Alb  x   /  TBili  0.5  /  DBili  x   /  AST  x   /  ALT  x   /  AlkPhos  x   07-11  TPro  7.7  /  Alb  4.0  /  TBili  0.6  /  DBili  x   /  AST  16  /  ALT  16  /  AlkPhos  92  07-10    PT/INR - ( 11 Jul 2024 05:11 )   PT: 11.3 sec;   INR: 1.00 ratio        PTT - ( 09 Jul 2024 14:07 )  PTT:31.0 sec    LIVER FUNCTIONS - ( 10 Jul 2024 06:20 )  Alb: 4.0 g/dL / Pro: 7.7 g/dL / ALK PHOS: 92 U/L / ALT: 16 U/L / AST: 16 U/L / GGT: x               
Date of Service  : 07-12-24     INTERVAL HPI/OVERNIGHT EVENTS: I am feeling fine and want to go home.   Vital Signs Last 24 Hrs  T(C): 36.9 (12 Jul 2024 12:30), Max: 36.9 (12 Jul 2024 12:30)  T(F): 98.4 (12 Jul 2024 12:30), Max: 98.4 (12 Jul 2024 12:30)  HR: 60 (12 Jul 2024 12:30) (59 - 72)  BP: 149/67 (12 Jul 2024 12:30) (130/56 - 182/78)  BP(mean): --  RR: 17 (12 Jul 2024 12:30) (16 - 18)  SpO2: 100% (12 Jul 2024 12:30) (98% - 100%)    Parameters below as of 12 Jul 2024 12:30  Patient On (Oxygen Delivery Method): room air      I&O's Summary    MEDICATIONS  (STANDING):  aspirin enteric coated 81 milliGRAM(s) Oral daily  atorvastatin 40 milliGRAM(s) Oral at bedtime  cloNIDine 0.2 milliGRAM(s) Oral at bedtime  dextrose 5%. 1000 milliLiter(s) (50 mL/Hr) IV Continuous <Continuous>  dextrose 5%. 1000 milliLiter(s) (100 mL/Hr) IV Continuous <Continuous>  dextrose 50% Injectable 12.5 Gram(s) IV Push once  dextrose 50% Injectable 25 Gram(s) IV Push once  dextrose 50% Injectable 25 Gram(s) IV Push once  glucagon  Injectable 1 milliGRAM(s) IntraMuscular once  heparin   Injectable 5000 Unit(s) SubCutaneous every 12 hours  insulin glargine Injectable (LANTUS) 10 Unit(s) SubCutaneous at bedtime  insulin lispro (ADMELOG) corrective regimen sliding scale   SubCutaneous three times a day before meals  insulin lispro (ADMELOG) corrective regimen sliding scale   SubCutaneous at bedtime  metoprolol succinate ER 50 milliGRAM(s) Oral daily  tamsulosin 0.4 milliGRAM(s) Oral at bedtime    MEDICATIONS  (PRN):  acetaminophen     Tablet .. 650 milliGRAM(s) Oral every 6 hours PRN Temp greater or equal to 38C (100.4F), Mild Pain (1 - 3)  aluminum hydroxide/magnesium hydroxide/simethicone Suspension 30 milliLiter(s) Oral every 4 hours PRN Dyspepsia  dextrose Oral Gel 15 Gram(s) Oral once PRN Blood Glucose LESS THAN 70 milliGRAM(s)/deciliter  melatonin 3 milliGRAM(s) Oral at bedtime PRN Insomnia  ondansetron Injectable 4 milliGRAM(s) IV Push every 8 hours PRN Nausea and/or Vomiting    LABS:                        12.8   3.85  )-----------( 240      ( 12 Jul 2024 04:10 )             37.5     07-12    140  |  101  |  16  ----------------------------<  182<H>  3.6   |  23  |  0.98    Ca    10.3      12 Jul 2024 04:10  Phos  3.3     07-12  Mg     1.70     07-12    TPro  x   /  Alb  x   /  TBili  0.5  /  DBili  x   /  AST  x   /  ALT  x   /  AlkPhos  x   07-11    PT/INR - ( 11 Jul 2024 05:11 )   PT: 11.3 sec;   INR: 1.00 ratio           Urinalysis Basic - ( 12 Jul 2024 04:10 )    Color: x / Appearance: x / SG: x / pH: x  Gluc: 182 mg/dL / Ketone: x  / Bili: x / Urobili: x   Blood: x / Protein: x / Nitrite: x   Leuk Esterase: x / RBC: x / WBC x   Sq Epi: x / Non Sq Epi: x / Bacteria: x      CAPILLARY BLOOD GLUCOSE      POCT Blood Glucose.: 201 mg/dL (12 Jul 2024 12:31)  POCT Blood Glucose.: 201 mg/dL (12 Jul 2024 08:14)  POCT Blood Glucose.: 267 mg/dL (11 Jul 2024 22:36)  POCT Blood Glucose.: 344 mg/dL (11 Jul 2024 19:30)  POCT Blood Glucose.: 254 mg/dL (11 Jul 2024 17:21)        Urinalysis Basic - ( 12 Jul 2024 04:10 )    Color: x / Appearance: x / SG: x / pH: x  Gluc: 182 mg/dL / Ketone: x  / Bili: x / Urobili: x   Blood: x / Protein: x / Nitrite: x   Leuk Esterase: x / RBC: x / WBC x   Sq Epi: x / Non Sq Epi: x / Bacteria: x      REVIEW OF SYSTEMS:  CONSTITUTIONAL: No fever, weight loss, or fatigue  EYES: No eye pain, visual disturbances, or discharge  ENMT:  No difficulty hearing, tinnitus, vertigo; No sinus or throat pain  NECK: No pain or stiffness  RESPIRATORY: No cough, wheezing, chills or hemoptysis; No shortness of breath  CARDIOVASCULAR: No chest pain, palpitations, dizziness, or leg swelling  GASTROINTESTINAL: No abdominal or epigastric pain. No nausea, vomiting, or hematemesis; No diarrhea or constipation. No melena or hematochezia.  GENITOURINARY: No dysuria, frequency, hematuria, or incontinence  NEUROLOGICAL: No headaches, memory loss, loss of strength, numbness, or tremors      Consultant(s) Notes Reviewed:  [x ] YES  [ ] NO    PHYSICAL EXAM:  GENERAL: NAD, well-groomed, well-developed,not in any distress ,  HEAD:  Atraumatic, Normocephalic  EYES: EOMI, PERRLA, conjunctiva and sclera clear  ENMT: No tonsillar erythema, exudates, or enlargement; Moist mucous membranes, Good dentition, No lesions  NECK: Supple, No JVD, Normal thyroid  NERVOUS SYSTEM:  Alert & Oriented X3, No focal deficit   CHEST/LUNG: Good air entry bilateral with no  rales, rhonchi, wheezing, or rubs  HEART: Regular rate and rhythm; No murmurs, rubs, or gallops  ABDOMEN: Soft, Nontender, Nondistended; Bowel sounds present  EXTREMITIES:  2+ Peripheral Pulses, No clubbing, cyanosis, or edema    Care Discussed with Consultants/Other Providers [ x] YES  [ ] NO
Cardiology Fellow Consult Progress Note    Subjective: no CP, SOB, dizziness, palpitations    No acute events overnight. No significant tele events. NSR, APCs.    REVIEW OF SYSTEMS:  All other review of systems is negative unless indicated above.    Physical Exam:  T(F): 98 (07-12), Max: 98.3 (07-11)  HR: 62 (07-12) (59 - 72)  BP: 130/56 (07-12) (130/56 - 182/78)  RR: 18 (07-12)  SpO2: 98% (07-12)  GENERAL: No acute distress, well-developed, overweight  CHEST/LUNG: Clear to auscultation bilaterally; No wheeze, equal breath sounds bilaterally   HEART: Regular rate and rhythm; No murmurs, rubs, or gallops  ABDOMEN: Soft, Nontender, Nondistended  EXTREMITIES:  No edema  NEUROLOGY: AAOx3    Cardiovascular diagnostic testings: personally reviewed    Imaging diagnostic testings: personally reviewed    Labs: Personally reviewed                        12.8   3.85  )-----------( 240      ( 12 Jul 2024 04:10 )             37.5     07-12    140  |  101  |  16  ----------------------------<  182<H>  3.6   |  23  |  0.98    Ca    10.3      12 Jul 2024 04:10  Phos  3.3     07-12  Mg     1.70     07-12    TPro  x   /  Alb  x   /  TBili  0.5  /  DBili  x   /  AST  x   /  ALT  x   /  AlkPhos  x   07-11    PT/INR - ( 11 Jul 2024 05:11 )   PT: 11.3 sec;   INR: 1.00 ratio           CARDIAC MARKERS ( 09 Jul 2024 17:30 )  19 ng/L / x     / x     / x     / x     / x      CARDIAC MARKERS ( 09 Jul 2024 14:07 )  21 ng/L / x     / x     / x     / x     / x            Thyroid Stimulating Hormone, Serum: 0.61 uIU/mL (07-09 @ 14:13)    
Date of Service  : 07-10-24     INTERVAL HPI/OVERNIGHT EVENTS: I am getting off and on palpitations.   Vital Signs Last 24 Hrs  T(C): 36.8 (10 Jul 2024 21:38), Max: 36.8 (10 Jul 2024 21:38)  T(F): 98.2 (10 Jul 2024 21:38), Max: 98.2 (10 Jul 2024 21:38)  HR: 64 (10 Jul 2024 21:38) (60 - 65)  BP: 161/80 (10 Jul 2024 21:38) (158/53 - 174/60)  BP(mean): --  RR: 18 (10 Jul 2024 21:38) (18 - 18)  SpO2: 100% (10 Jul 2024 21:) (97% - 100%)    Parameters below as of 10 Jul 2024 21:38  Patient On (Oxygen Delivery Method): room air      I&O's Summary    MEDICATIONS  (STANDING):  aspirin enteric coated 81 milliGRAM(s) Oral daily  atorvastatin 40 milliGRAM(s) Oral at bedtime  cloNIDine 0.2 milliGRAM(s) Oral at bedtime  dextrose 5%. 1000 milliLiter(s) (50 mL/Hr) IV Continuous <Continuous>  dextrose 5%. 1000 milliLiter(s) (100 mL/Hr) IV Continuous <Continuous>  dextrose 50% Injectable 25 Gram(s) IV Push once  dextrose 50% Injectable 25 Gram(s) IV Push once  dextrose 50% Injectable 12.5 Gram(s) IV Push once  glucagon  Injectable 1 milliGRAM(s) IntraMuscular once  heparin   Injectable 5000 Unit(s) SubCutaneous every 12 hours  insulin glargine Injectable (LANTUS) 10 Unit(s) SubCutaneous at bedtime  insulin lispro (ADMELOG) corrective regimen sliding scale   SubCutaneous at bedtime  insulin lispro (ADMELOG) corrective regimen sliding scale   SubCutaneous three times a day before meals  tamsulosin 0.4 milliGRAM(s) Oral at bedtime    MEDICATIONS  (PRN):  acetaminophen     Tablet .. 650 milliGRAM(s) Oral every 6 hours PRN Temp greater or equal to 38C (100.4F), Mild Pain (1 - 3)  aluminum hydroxide/magnesium hydroxide/simethicone Suspension 30 milliLiter(s) Oral every 4 hours PRN Dyspepsia  dextrose Oral Gel 15 Gram(s) Oral once PRN Blood Glucose LESS THAN 70 milliGRAM(s)/deciliter  melatonin 3 milliGRAM(s) Oral at bedtime PRN Insomnia  ondansetron Injectable 4 milliGRAM(s) IV Push every 8 hours PRN Nausea and/or Vomiting    LABS:                        12.2   5.62  )-----------( 255      ( 10 Jul 2024 06:20 )             36.5     07-10    143  |  103  |  26<H>  ----------------------------<  67<L>  4.0   |  25  |  1.21    Ca    10.1      10 Jul 2024 06:20  Phos  3.8     07-10  Mg     2.00     07-10    TPro  7.7  /  Alb  4.0  /  TBili  0.6  /  DBili  x   /  AST  16  /  ALT  16  /  AlkPhos  92  07-10    PT/INR - ( 2024 14:07 )   PT: 9.5 sec;   INR: <0.90 ratio         PTT - ( 2024 14:07 )  PTT:31.0 sec  Urinalysis Basic - ( 10 Jul 2024 12:13 )    Color: Yellow / Appearance: Clear / S.022 / pH: x  Gluc: x / Ketone: 15 mg/dL  / Bili: Negative / Urobili: 1.0 mg/dL   Blood: x / Protein: Trace mg/dL / Nitrite: Negative   Leuk Esterase: Negative / RBC: 0 /HPF / WBC 0 /HPF   Sq Epi: x / Non Sq Epi: 0 /HPF / Bacteria: Negative /HPF      CAPILLARY BLOOD GLUCOSE      POCT Blood Glucose.: 309 mg/dL (10 Jul 2024 22:15)  POCT Blood Glucose.: 305 mg/dL (10 Jul 2024 22:11)  POCT Blood Glucose.: 252 mg/dL (10 Jul 2024 18:02)  POCT Blood Glucose.: 253 mg/dL (10 Jul 2024 12:15)  POCT Blood Glucose.: 83 mg/dL (10 Jul 2024 09:21)        Urinalysis Basic - ( 10 Jul 2024 12:13 )    Color: Yellow / Appearance: Clear / S.022 / pH: x  Gluc: x / Ketone: 15 mg/dL  / Bili: Negative / Urobili: 1.0 mg/dL   Blood: x / Protein: Trace mg/dL / Nitrite: Negative   Leuk Esterase: Negative / RBC: 0 /HPF / WBC 0 /HPF   Sq Epi: x / Non Sq Epi: 0 /HPF / Bacteria: Negative /HPF      REVIEW OF SYSTEMS:  CONSTITUTIONAL: No fever, weight loss, or fatigue  EYES: No eye pain, visual disturbances, or discharge  ENMT:  No difficulty hearing, tinnitus, vertigo; No sinus or throat pain  NECK: No pain or stiffness  RESPIRATORY: No cough, wheezing, chills or hemoptysis; No shortness of breath  CARDIOVASCULAR: No chest pain, palpitations, dizziness, or leg swelling  GASTROINTESTINAL: No abdominal or epigastric pain. No nausea, vomiting, or hematemesis; No diarrhea or constipation. No melena or hematochezia.  GENITOURINARY: No dysuria, frequency, hematuria, or incontinence  NEUROLOGICAL: No headaches, memory loss, loss of strength, numbness, or tremors  Consultant(s) Notes Reviewed:  [x ] YES  [ ] NO    PHYSICAL EXAM:  GENERAL: NAD, well-groomed, well-developed,not in any distress ,  HEAD:  Atraumatic, Normocephalic  EYES: EOMI, PERRLA, conjunctiva and sclera clear  ENMT: No tonsillar erythema, exudates, or enlargement; Moist mucous membranes, Good dentition, No lesions  NECK: Supple, No JVD, Normal thyroid  NERVOUS SYSTEM:  Alert & Oriented X3, No focal deficit   CHEST/LUNG: Good air entry bilateral with no  rales, rhonchi, wheezing, or rubs  HEART: Regular rate and rhythm; No murmurs, rubs, or gallops  ABDOMEN: Soft, Nontender, Nondistended; Bowel sounds present  EXTREMITIES:  2+ Peripheral Pulses, No clubbing, cyanosis, or edema    Care Discussed with Consultants/Other Providers [ x] YES  [ ] NO

## 2024-07-12 NOTE — DISCHARGE NOTE PROVIDER - NSDCMRMEDTOKEN_GEN_ALL_CORE_FT
aspirin 81 mg oral delayed release tablet: 1 tab(s) orally once a day  atorvastatin 40 mg oral tablet: 1 tab(s) orally once a day  cilostazol 50 mg oral tablet: 1 tab(s) orally 2 times a day  cloNIDine 0.2 mg oral tablet: 1 tab(s) orally once a day (at bedtime)  glipiZIDE 10 mg oral tablet, extended release: 1 tab(s) orally once a day  IsoRel OTC supplement: Take orally as directed  lisinopril 40 mg oral tablet: 1 tab(s) orally once a day  metFORMIN 1000 mg oral tablet: 1 tab(s) orally 2 times a day  metoprolol succinate 50 mg oral tablet, extended release: 1 tab(s) orally once a day  tamsulosin 0.4 mg oral capsule: 1 cap(s) orally once a day  Toujeo SoloStar 300 units/mL subcutaneous solution: 40 unit(s) subcutaneous once a day after dinner

## 2024-07-12 NOTE — DISCHARGE NOTE PROVIDER - HOSPITAL COURSE
73 y/o M with pmhx of prostate Ca, HTN, DM2 presented to the ED for new onset palpitations and dizziness. Unclear etiology at this time. Patient will need EP eval for arrythmia.    Hospital course:   Palpitations.   - patient presented for new onset palpitations  - unclear etiology at this time, EKG shows NSR RBBB  - EP help appreciated and Z patch before DC.   - tele monitoring  - orthostatics negative.   -TTE pending.   < from: CT Angio Chest PE Protocol w/ IV Cont (07.09.24 @ 17:21) >  IMPRESSION: Limited PE study for evaluation of some of the subsegmental   pulmonary arterial branches due to respiratory motion artifact. No   pulmonary arterial emboli within the other well visualized pulmonary  arteries.    Indeterminate partially imaged 1.5 cm hepatic hypodense lesion.   Nonemergent contrast enhanced liver MRI is recommended for complete   evaluation.    ANIBAL (acute kidney injury).   - Resolved .  - unclear etiology of ANIBAL at this time, likely from ACEi use  - will hold ACEi for now  - urine lytes sent  - Trending down.    Liver lesion.   - patient has 1.5 cm lesion on the liver  - I advised outpatient follow up.  - Nonemergent contrast enhanced liver MRI is recommended for complete evaluation. Will do oupt.    Benign essential HTN.   - hold BP meds for now  - will c/w only clonidine to prevent rebound HTN for now.    DM2 (diabetes mellitus, type 2).   - patient takes 40U toujeo QHs  - will c/w with 10U lantus for now, uptitrate as needed.    Medication management.   - patient provided list however when cross checked with [facility] pharmacy rec that was done, clonidine and hctz was not on his list, patient's list is likely old.    On 7/12/24, case discussed with Dr. Thomas, pt is medically cleared for discharge to home with current medications, zio patch plan and outpatient EP follow up.

## 2024-07-12 NOTE — DISCHARGE NOTE NURSING/CASE MANAGEMENT/SOCIAL WORK - NSDCPEFALRISK_GEN_ALL_CORE
For information on Fall & Injury Prevention, visit: [email or web address] OR  [email or web address] OR  https://www.cdc.gov/steadi/patient.html

## 2024-07-12 NOTE — PROGRESS NOTE ADULT - ATTENDING COMMENTS
75 y/o M with pmhx of prostate Ca, HTN, DM2 presented to the ED for new onset palpitations and dizziness. Symptoms have been happening for 2 months, however had gotten worse in the last 2 weeks. The patient reported symptoms occur only with exertion like walking up and down the stairs. He feels palpitations then becomes lightheaded. No SVT or VT on tele here. Has APCs. Discharge with [facility]. Ok to continue home BB.

## 2024-07-12 NOTE — PROGRESS NOTE ADULT - ASSESSMENT
75 y/o M with pmhx of prostate Ca, HTN, DM2 presented to the ED for new onset palpitations and dizziness. Unclear etiology at this time. Patient will need EP eval for arrythmia.       Problem/Plan - 1:  ·  Problem: Palpitations.   ·  Plan: - patient presented for new onset palpitations  - unclear etiology at this time, EKG shows NSR RBBB  - EP consult noted- may benefit from a loop recorder - day team to follow  - tele monitoring  - hold BP meds for now  - orthostatics pending.    < from: CT Angio Chest PE Protocol w/ IV Cont (07.09.24 @ 17:21) >  IMPRESSION: Limited PE study for evaluation of some of the subsegmental   pulmonary arterial branches due to respiratory motion artifact. No   pulmonary arterial emboli within the other well visualized pulmonary  arteries.    Indeterminate partially imaged 1.5 cm hepatic hypodense lesion.   Nonemergent contrast enhanced liver MRI is recommended for complete   evaluation.    < end of copied text >   Problem/Plan - 2:  ·  Problem: ANIBAL (acute kidney injury).   ·  Plan: - currently Cr 1.7  - unclear etiology of ANIBAL at this time, likely from ACEi use  - will hold ACEi for now  - urine lytes sent  - nephrology consult in the AM.  - Trending down.     Problem/Plan - 3:  ·  Problem: Liver lesion.   ·  Plan: - patient has 1.5 cm lesion on the liver  - I advised outpatient follow up.  Nonemergent contrast enhanced liver MRI is recommended for complete   evaluation.     Problem/Plan - 4:  ·  Problem: Benign essential HTN.   ·  Plan: - hold BP meds for now  - will c/w only clonidine to prevent rebound HTN for now.     Problem/Plan - 5:  ·  Problem: DM2 (diabetes mellitus, type 2).   ·  Plan: - patient takes 40U toujeo QHs  - will c/w with 10U lantus for now, uptitrate as needed.     Problem/Plan - 6:  ·  Problem: Medication management.   ·  Plan: - patient provided list however when cross checked with [facility] rec that was done, clonidine and hctz was not on his list, patient's list is likely old.     Problem/Plan - 7:  ·  Problem: Prophylactic measure.   ·  Plan: - heparin subq for dvt ppx.      
75 y/o M with pmhx of prostate Ca, HTN, DM2 presented to the ED for new onset palpitations and dizziness. Symptoms have been happening for 2 months, however had gotten worse in the last 2 weeks. The patient reported symptoms occur only with exertion like walking up and down the stairs. He feels palpitations then becomes lightheaded. Denies LOC. Denies chest pain. Denies shortness of breath at rest. ROS otherwise negative. The patient denies hx of cardiac disease. Denies tobacco use. Denies fevers. Palpitations gets better with rest.    The episode that brought him to the hospital was on 7/9, working out at the gym and felt OK working out, then went to the sauna, and then the pool to swim for 15 minutes. After getting out from the pool he felt very dizzy and had to lay near the pool for ~10 minutes before he could get up.    EP consulted for palpitations..    EKG shows NSR 70s with RBBB.  Tele shows NSR 50s-60s with APCs.  He takes Toprol 50mg daily.    No prior TTE in system.    Impression/Recommendations:  - Patient's dizziness episode that brought him to the hospital sounds either vagal or orthostatic based on symptoms immediately coming out of a pool. Chronic palpitations with exercise could also be orthostatic or vagal and suspect less likely arrhythmogenic  - C/w beta-blocker  - TTE  - Tele while in-house  - will set patient up with Zio patch prior to discharge 
75 y/o M with pmhx of prostate Ca, HTN, DM2 presented to the ED for new onset palpitations and dizziness. Symptoms have been happening for 2 months, however had gotten worse in the last 2 weeks. The patient reported symptoms occur only with exertion like walking up and down the stairs. He feels palpitations then becomes lightheaded. Denies LOC. Denies chest pain. Denies shortness of breath at rest. ROS otherwise negative. The patient denies hx of cardiac disease. Denies tobacco use. Denies fevers. Palpitations gets better with rest.    The episode that brought him to the hospital was on 7/9, working out at the gym and felt OK working out, then went to the sauna, and then the pool to swim for 15 minutes. After getting out from the pool he felt very dizzy and had to lay near the pool for ~10 minutes before he could get up.    EP consulted for palpitations..    EKG shows NSR 70s with RBBB.  Tele shows NSR 50s-60s with APCs.  He takes Toprol 50mg daily.    TTE normal.    Impression/Recommendations:  - Patient's dizziness episode that brought him to the hospital sounds either vagal or orthostatic based on symptoms immediately coming out of a pool. Chronic palpitations with exercise could also be orthostatic or vagal and suspect less likely arrhythmogenic  - C/w beta-blocker  - Tele while in-house  - will set patient up with Zio patch prior to discharge   - EP to sign off, please call back at discharge for ZioPatch arrangement     Note incomplete until cosigned by attending    Donaldo Simms, PGY-5  Cardiology Fellow    For all new consults  [email or web address]  Login: [email or web address]
73 y/o M with pmhx of prostate Ca, HTN, DM2 presented to the ED for new onset palpitations and dizziness. Unclear etiology at this time. Patient will need EP eval for arrythmia.       Problem/Plan - 1:  ·  Problem: Palpitations.   ·  Plan: - patient presented for new onset palpitations  - unclear etiology at this time, EKG shows NSR RBBB  - EP help appreciated and Z patch before DC.   - tele monitoring  - hold BP meds for now  - orthostatics negative.   -TTE pending.   < from: CT Angio Chest PE Protocol w/ IV Cont (07.09.24 @ 17:21) >  IMPRESSION: Limited PE study for evaluation of some of the subsegmental   pulmonary arterial branches due to respiratory motion artifact. No   pulmonary arterial emboli within the other well visualized pulmonary  arteries.    Indeterminate partially imaged 1.5 cm hepatic hypodense lesion.   Nonemergent contrast enhanced liver MRI is recommended for complete   evaluation.     Problem/Plan - 2:  ·  Problem: ANIBAL (acute kidney injury).   ·  Plan: - Resolved .  - unclear etiology of ANIBAL at this time, likely from ACEi use  - will hold ACEi for now  - urine lytes sent  - Trending down.     Problem/Plan - 3:  ·  Problem: Liver lesion.   ·  Plan: - patient has 1.5 cm lesion on the liver  - I advised outpatient follow up.  Nonemergent contrast enhanced liver MRI is recommended for complete   evaluation .  Will do oupt.     Problem/Plan - 4:  ·  Problem: Benign essential HTN.   ·  Plan: - hold BP meds for now  - will c/w only clonidine to prevent rebound HTN for now.     Problem/Plan - 5:  ·  Problem: DM2 (diabetes mellitus, type 2).   ·  Plan: - patient takes 40U toujeo QHs  - will c/w with 10U lantus for now, uptitrate as needed.     Problem/Plan - 6:  ·  Problem: Medication management.   ·  Plan: - patient provided list however when cross checked with [facility] pharmacy rec that was done, clonidine and hctz was not on his list, patient's list is likely old.     Problem/Plan - 7:  ·  Problem: Prophylactic measure.   ·  Plan: - heparin subq for dvt ppx.      Dispo : Dc planning pending TTE.   
75 y/o M with pmhx of prostate Ca, HTN, DM2 presented to the ED for new onset palpitations and dizziness. Unclear etiology at this time. Patient will need EP eval for arrythmia.       Problem/Plan - 1:  ·  Problem: Palpitations.   ·  Plan: - patient presented for new onset palpitations  - unclear etiology at this time, EKG shows NSR RBBB  - EP help appreciated and Zio patch before DC.   - tele monitoring  - hold BP meds for now  - orthostatics negative.   -TTE pending.   < from: CT Angio Chest PE Protocol w/ IV Cont (07.09.24 @ 17:21) >  IMPRESSION: Limited PE study for evaluation of some of the subsegmental   pulmonary arterial branches due to respiratory motion artifact. No   pulmonary arterial emboli within the other well visualized pulmonary  arteries.    Indeterminate partially imaged 1.5 cm hepatic hypodense lesion.   Nonemergent contrast enhanced liver MRI is recommended for complete   evaluation.     Problem/Plan - 2:  ·  Problem: ANIBAL (acute kidney injury).   ·  Plan: - Resolved .  - unclear etiology of ANIBAL at this time, likely from ACEi use  - will hold ACEi for now  - urine lytes sent  - Trending down.     Problem/Plan - 3:  ·  Problem: Liver lesion.   ·  Plan: - patient has 1.5 cm lesion on the liver  - I advised outpatient follow up.  Nonemergent contrast enhanced liver MRI is recommended for complete   evaluation .  Will do oupt as doesn't want to wait .     Problem/Plan - 4:  ·  Problem: Benign essential HTN.   ·  Plan: - hold BP meds for now  - will c/w only clonidine to prevent rebound HTN for now.     Problem/Plan - 5:  ·  Problem: DM2 (diabetes mellitus, type 2).   ·  Plan: - patient takes 40U toujeo QHs  - will c/w with 10U lantus for now, uptitrate as needed.     Problem/Plan - 6:  ·  Problem: Medication management.   ·  Plan: - patient provided list however when cross checked with [facility] rec that was done, clonidine and hctz was not on his list, patient's list is likely old.     Problem/Plan - 7:  ·  Problem: Prophylactic measure.   ·  Plan: - heparin subq for dvt ppx.      Dispo : Dc planning home as cleared by EP

## 2024-07-12 NOTE — DISCHARGE NOTE NURSING/CASE MANAGEMENT/SOCIAL WORK - PATIENT PORTAL LINK FT
You can access the FollowMyHealth Patient Portal offered by [facility] by registering at the following website: [email or web address]. By joining [facility]’s FollowMyHealth portal, you will also be able to view your health information using other applications (apps) compatible with our system.

## 2024-08-01 ENCOUNTER — NON-APPOINTMENT (OUTPATIENT)
Age: 74
End: 2024-08-01

## 2024-08-02 DIAGNOSIS — R00.2 PALPITATIONS: ICD-10-CM

## 2024-11-25 ENCOUNTER — EMERGENCY (EMERGENCY)
Facility: HOSPITAL | Age: 74
LOS: 1 days | Discharge: HOME | End: 2024-11-25
Attending: STUDENT IN AN ORGANIZED HEALTH CARE EDUCATION/TRAINING PROGRAM | Admitting: STUDENT IN AN ORGANIZED HEALTH CARE EDUCATION/TRAINING PROGRAM
Payer: MEDICARE

## 2024-11-25 VITALS
SYSTOLIC BLOOD PRESSURE: 241 MMHG | RESPIRATION RATE: 18 BRPM | OXYGEN SATURATION: 98 % | HEART RATE: 65 BPM | TEMPERATURE: 98 F | WEIGHT: 240.08 LBS | DIASTOLIC BLOOD PRESSURE: 99 MMHG

## 2024-11-25 VITALS
HEART RATE: 71 BPM | DIASTOLIC BLOOD PRESSURE: 60 MMHG | SYSTOLIC BLOOD PRESSURE: 172 MMHG | RESPIRATION RATE: 18 BRPM | OXYGEN SATURATION: 99 %

## 2024-11-25 LAB
ALBUMIN SERPL ELPH-MCNC: 4 G/DL — SIGNIFICANT CHANGE UP (ref 3.3–5)
ALP SERPL-CCNC: 100 U/L — SIGNIFICANT CHANGE UP (ref 40–120)
ALT FLD-CCNC: 16 U/L — SIGNIFICANT CHANGE UP (ref 4–41)
ANION GAP SERPL CALC-SCNC: 13 MMOL/L — SIGNIFICANT CHANGE UP (ref 7–14)
AST SERPL-CCNC: 16 U/L — SIGNIFICANT CHANGE UP (ref 4–40)
BASOPHILS # BLD AUTO: 0.02 K/UL — SIGNIFICANT CHANGE UP (ref 0–0.2)
BASOPHILS NFR BLD AUTO: 0.4 % — SIGNIFICANT CHANGE UP (ref 0–2)
BILIRUB SERPL-MCNC: 0.4 MG/DL — SIGNIFICANT CHANGE UP (ref 0.2–1.2)
BLOOD GAS VENOUS COMPREHENSIVE RESULT: SIGNIFICANT CHANGE UP
BUN SERPL-MCNC: 10 MG/DL — SIGNIFICANT CHANGE UP (ref 7–23)
CALCIUM SERPL-MCNC: 10.2 MG/DL — SIGNIFICANT CHANGE UP (ref 8.4–10.5)
CHLORIDE SERPL-SCNC: 105 MMOL/L — SIGNIFICANT CHANGE UP (ref 98–107)
CK MB CFR SERPL CALC: 2.1 NG/ML — SIGNIFICANT CHANGE UP
CO2 SERPL-SCNC: 25 MMOL/L — SIGNIFICANT CHANGE UP (ref 22–31)
CREAT SERPL-MCNC: 0.95 MG/DL — SIGNIFICANT CHANGE UP (ref 0.5–1.3)
EGFR: 85 ML/MIN/1.73M2 — SIGNIFICANT CHANGE UP
EOSINOPHIL # BLD AUTO: 0.16 K/UL — SIGNIFICANT CHANGE UP (ref 0–0.5)
EOSINOPHIL NFR BLD AUTO: 2.9 % — SIGNIFICANT CHANGE UP (ref 0–6)
GLUCOSE SERPL-MCNC: 73 MG/DL — SIGNIFICANT CHANGE UP (ref 70–99)
HCT VFR BLD CALC: 37.9 % — LOW (ref 39–50)
HGB BLD-MCNC: 12.6 G/DL — LOW (ref 13–17)
IANC: 3.32 K/UL — SIGNIFICANT CHANGE UP (ref 1.8–7.4)
IMM GRANULOCYTES NFR BLD AUTO: 0.4 % — SIGNIFICANT CHANGE UP (ref 0–0.9)
LYMPHOCYTES # BLD AUTO: 1.48 K/UL — SIGNIFICANT CHANGE UP (ref 1–3.3)
LYMPHOCYTES # BLD AUTO: 26.8 % — SIGNIFICANT CHANGE UP (ref 13–44)
MCHC RBC-ENTMCNC: 31.4 PG — SIGNIFICANT CHANGE UP (ref 27–34)
MCHC RBC-ENTMCNC: 33.2 G/DL — SIGNIFICANT CHANGE UP (ref 32–36)
MCV RBC AUTO: 94.5 FL — SIGNIFICANT CHANGE UP (ref 80–100)
MONOCYTES # BLD AUTO: 0.53 K/UL — SIGNIFICANT CHANGE UP (ref 0–0.9)
MONOCYTES NFR BLD AUTO: 9.6 % — SIGNIFICANT CHANGE UP (ref 2–14)
NEUTROPHILS # BLD AUTO: 3.32 K/UL — SIGNIFICANT CHANGE UP (ref 1.8–7.4)
NEUTROPHILS NFR BLD AUTO: 59.9 % — SIGNIFICANT CHANGE UP (ref 43–77)
NRBC # BLD: 0 /100 WBCS — SIGNIFICANT CHANGE UP (ref 0–0)
NRBC # FLD: 0 K/UL — SIGNIFICANT CHANGE UP (ref 0–0)
NT-PROBNP SERPL-SCNC: 185 PG/ML — SIGNIFICANT CHANGE UP
PLATELET # BLD AUTO: 233 K/UL — SIGNIFICANT CHANGE UP (ref 150–400)
POTASSIUM SERPL-MCNC: 4.3 MMOL/L — SIGNIFICANT CHANGE UP (ref 3.5–5.3)
POTASSIUM SERPL-SCNC: 4.3 MMOL/L — SIGNIFICANT CHANGE UP (ref 3.5–5.3)
PROT SERPL-MCNC: 7.7 G/DL — SIGNIFICANT CHANGE UP (ref 6–8.3)
RBC # BLD: 4.01 M/UL — LOW (ref 4.2–5.8)
RBC # FLD: 16.5 % — HIGH (ref 10.3–14.5)
SODIUM SERPL-SCNC: 143 MMOL/L — SIGNIFICANT CHANGE UP (ref 135–145)
TROPONIN T, HIGH SENSITIVITY RESULT: 16 NG/L — SIGNIFICANT CHANGE UP
WBC # BLD: 5.53 K/UL — SIGNIFICANT CHANGE UP (ref 3.8–10.5)
WBC # FLD AUTO: 5.53 K/UL — SIGNIFICANT CHANGE UP (ref 3.8–10.5)

## 2024-11-25 PROCEDURE — 99285 EMERGENCY DEPT VISIT HI MDM: CPT

## 2024-11-25 PROCEDURE — 93010 ELECTROCARDIOGRAM REPORT: CPT

## 2024-11-25 PROCEDURE — 71046 X-RAY EXAM CHEST 2 VIEWS: CPT | Mod: 26

## 2024-11-25 RX ORDER — HYDRALAZINE HYDROCHLORIDE 50 MG/1
10 TABLET, FILM COATED ORAL ONCE
Refills: 0 | Status: COMPLETED | OUTPATIENT
Start: 2024-11-25 | End: 2024-11-25

## 2024-11-25 RX ADMIN — HYDRALAZINE HYDROCHLORIDE 10 MILLIGRAM(S): 50 TABLET, FILM COATED ORAL at 19:11

## 2024-11-25 NOTE — ED PROVIDER NOTE - ATTENDING CONTRIBUTION TO CARE
I, Lai Palacio DO personally saw the patient with MARILU.  I have personally performed a face to face diagnostic evaluation on this patient.  I have reviewed the MARILU note and agree with the history, exam, and plan of care, except as noted.  I personally saw the patient and performed a substantive portion of the visit including all aspects of the medical decision making.    I, Lai Palacio DO,  performed the initial face to face bedside interview with this patient regarding history of present illness, review of symptoms and relevant past medical, social and family history.  I completed an independent physical examination.  I was the initial provider who evaluated this patient. I have signed out the follow up of any pending tests (i.e. labs, radiological studies) to the resident.  I have communicated the patient’s plan of care and disposition with the resident.  The history, relevant review of systems, past medical and surgical history, medical decision making, and physical examination was documented by the scribe in my presence and I attest to the accuracy of the documentation.    agree with the resident note.   · Physical Examination: Gen: AAOx3, non-toxic  	HEENT: NCAT. PEERLA, EOMI, oral mucosa moist, normal conjunctiva  	Lung: CTAB, no respiratory distress, no wheezes/rhonchi/rales B/L, speaking in full sentences  	CV: RRR, no murmurs, rubs or gallops  	Abd: soft, NTND, no guarding, no CVA tenderness  	MSK: no visible deformities  	Neuro: No focal sensory or motor deficits  	Skin: Warm, well perfused, no rash  Psych: normal affect.    Shared PE

## 2024-11-25 NOTE — ED ADULT NURSE REASSESSMENT NOTE - NS ED NURSE REASSESS COMMENT FT1
report received from day shift ED RN. pt resting in stretcher in NAD, A&OX4, RR even and unlabored. maintained on cardiac monitor, vitals as documented. pt states compliant with bp medications at home and normal dose is in the morning. PA made aware. pt denies headache or n/t at this time, able to speak in clear complete sentences, SEPULVEDA equal bilaterally. safety measures maintained, side rails up x2. daughter at bedside.

## 2024-11-25 NOTE — ED ADULT NURSE NOTE - OBJECTIVE STATEMENT
pt received spot 28. pt A+Ox3 c/o headache x 3 days. pt hypertensive states compliant with medications. pt denies chest pain/sob. respirations even and unlabored. placed on monitor. unable to get IV access. facilitator paged and awaiting attempt. no neuro deficits noted. will monitor,

## 2024-11-25 NOTE — ED ADULT NURSE NOTE - CHIEF COMPLAINT QUOTE
Pt ambulatory to triage c/o headache x 2 days. Pt reports elevated BP at home unrelieved by BP medications. No facial droop or slurred speech. Denies vision changes. FS 77 in triage. Provided apple juice.

## 2024-11-25 NOTE — ED PROVIDER NOTE - CLINICAL SUMMARY MEDICAL DECISION MAKING FREE TEXT BOX
74M, PMHx HTN, T2DM, HLD, peripheral vascular disease presents to ED complaining of hypertension.  Patient states for the past 3 days, he has had a consistent headache, mild at onset, gradual increase in intensity, with associated hypertension.  Patient has consistently had systolics in the 200s.  Patient was seen at [facility] yesterday, had a negative workup including a negative head CT scan.  Was prescribed HCTZ.  Was unable to  his HCTZ until today at 2 PM.  Patient was seen in urgent care today for same complaint, was sent to ED.  Endorses that the headache gets better when the blood pressure gets better and worsens when the blood pressure worsens.  Denies double/blurry vision, nausea/vomiting, focal weakness, syncope.  Denies fever, cough, chills, abdominal pain, diarrhea, constipation, trauma.  Of note, patient has been having shortness of breath and palpitations with exertion, denies chest pain with exertion.  Patient was scheduled for outpatient stress test today with Dr. Hernandez, missed appointment due to scheduling mistake.  Patient currently denying chest pain at rest, shortness of breath at rest, palpitations at rest.  Patient currently asymptomatic from a chest pain, palpitations, shortness of breath perspective.    Vitals:  /9 9  Resp 18 unlabored  HR 65  Temp 97.9 oral  SpO2 98% room air    EKG sinus, with PACs.  RBBB noted.  T wave inversion V3.  No ST elevation or depression.  When compared with prior, morphology is not significantly changed.  Low concern for subarachnoid hemorrhage given absence of additional symptoms (double/blurry vision, nausea, vomiting).  CT head was negative yesterday, although this does not rule out subarachnoid hemorrhage.  However, given limited concern, will defer lumbar puncture.  Low concern for acute ACS given unchanged EKG, absence of chest pain.  Low concern for acute aortic pathology given absence of chest pain.  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  Will get basic labs to assess for endorgan damage.  Chest x-ray.  Will give hydralazine for treatment in ED.  If blood pressure control improved, labs normal, discharge home with outpatient follow-up.    ***Refer to progress notes as a continuation of this MDM, for updates in clinical course, and reassessments.***

## 2024-11-25 NOTE — ED PROVIDER NOTE - PHYSICAL EXAMINATION
Gen: AAOx3, non-toxic  HEENT: NCAT. PEERLA, EOMI, oral mucosa moist, normal conjunctiva  Lung: CTAB, no respiratory distress, no wheezes/rhonchi/rales B/L, speaking in full sentences  CV: RRR, no murmurs, rubs or gallops  Abd: soft, NTND, no guarding, no CVA tenderness  MSK: no visible deformities  Neuro: No focal sensory or motor deficits  Skin: Warm, well perfused, no rash  Psych: normal affect.

## 2024-11-25 NOTE — ED PROVIDER NOTE - ED STEMI HIDDEN
Lisa Anderson MD  
hide

## 2024-11-25 NOTE — ED ADULT TRIAGE NOTE - CHIEF COMPLAINT QUOTE
Pt ambulatory to triage c/o headache x 2 days. Pt reports elevated BP at home unrelieved by BP medications. No facial droop or slurred speech. Denies vision changes. Pt ambulatory to triage c/o headache x 2 days. Pt reports elevated BP at home unrelieved by BP medications. No facial droop or slurred speech. Denies vision changes. FS 77 in triage. Provided apple juice.

## 2024-11-25 NOTE — ED PROVIDER NOTE - NS ED ATTENDING STATEMENT MOD
I have seen and examined this patient and fully participated in the care of this patient as the teaching attending.  The service was shared with the MARILU.  I reviewed and verified the documentation.

## 2024-11-25 NOTE — ED ADULT NURSE REASSESSMENT NOTE - NS ED NURSE REASSESS COMMENT FT1
discharge papers provided by PA, pt and daughter verbalized understanding of instructions. iv removed, gauze dressing in place. pt able to change into clothes independently. able to walk with steady gait, all belongings with pt, daughter at side

## 2024-11-25 NOTE — ED PROVIDER NOTE - PATIENT PORTAL LINK FT
You can access the FollowMyHealth Patient Portal offered by [facility] by registering at the following website: [email or web address]. By joining [employer]’s FollowMyHealth portal, you will also be able to view your health information using other applications (apps) compatible with our system.

## 2025-09-14 ENCOUNTER — EMERGENCY (EMERGENCY)
Facility: HOSPITAL | Age: 75
LOS: 1 days | End: 2025-09-14
Attending: STUDENT IN AN ORGANIZED HEALTH CARE EDUCATION/TRAINING PROGRAM | Admitting: STUDENT IN AN ORGANIZED HEALTH CARE EDUCATION/TRAINING PROGRAM
Payer: MEDICARE

## 2025-09-14 VITALS
HEIGHT: 66 IN | OXYGEN SATURATION: 98 % | HEART RATE: 77 BPM | TEMPERATURE: 98 F | RESPIRATION RATE: 16 BRPM | WEIGHT: 218.04 LBS | SYSTOLIC BLOOD PRESSURE: 166 MMHG | DIASTOLIC BLOOD PRESSURE: 74 MMHG

## 2025-09-14 VITALS — WEIGHT: 218.7 LBS

## 2025-09-14 LAB
ALBUMIN SERPL ELPH-MCNC: 4.1 G/DL — SIGNIFICANT CHANGE UP (ref 3.3–5)
ALP SERPL-CCNC: 95 U/L — SIGNIFICANT CHANGE UP (ref 40–120)
ALT FLD-CCNC: 19 U/L — SIGNIFICANT CHANGE UP (ref 4–41)
ANION GAP SERPL CALC-SCNC: 13 MMOL/L — SIGNIFICANT CHANGE UP (ref 7–14)
APTT BLD: 30.7 SEC — SIGNIFICANT CHANGE UP (ref 26.1–36.8)
AST SERPL-CCNC: 17 U/L — SIGNIFICANT CHANGE UP (ref 4–40)
BASOPHILS # BLD AUTO: 0.03 K/UL — SIGNIFICANT CHANGE UP (ref 0–0.2)
BASOPHILS NFR BLD AUTO: 0.5 % — SIGNIFICANT CHANGE UP (ref 0–2)
BILIRUB SERPL-MCNC: 0.4 MG/DL — SIGNIFICANT CHANGE UP (ref 0.2–1.2)
BUN SERPL-MCNC: 17 MG/DL — SIGNIFICANT CHANGE UP (ref 7–23)
CALCIUM SERPL-MCNC: 10.6 MG/DL — HIGH (ref 8.4–10.5)
CHLORIDE SERPL-SCNC: 102 MMOL/L — SIGNIFICANT CHANGE UP (ref 98–107)
CO2 SERPL-SCNC: 23 MMOL/L — SIGNIFICANT CHANGE UP (ref 22–31)
CREAT SERPL-MCNC: 1.18 MG/DL — SIGNIFICANT CHANGE UP (ref 0.5–1.3)
EGFR: 65 ML/MIN/1.73M2 — SIGNIFICANT CHANGE UP
EGFR: 65 ML/MIN/1.73M2 — SIGNIFICANT CHANGE UP
EOSINOPHIL # BLD AUTO: 0.15 K/UL — SIGNIFICANT CHANGE UP (ref 0–0.5)
EOSINOPHIL NFR BLD AUTO: 2.5 % — SIGNIFICANT CHANGE UP (ref 0–6)
GLUCOSE SERPL-MCNC: 119 MG/DL — HIGH (ref 70–99)
HCT VFR BLD CALC: 35.3 % — LOW (ref 39–50)
HGB BLD-MCNC: 12 G/DL — LOW (ref 13–17)
IMM GRANULOCYTES # BLD AUTO: 0.03 K/UL — SIGNIFICANT CHANGE UP (ref 0–0.07)
IMM GRANULOCYTES NFR BLD AUTO: 0.5 % — SIGNIFICANT CHANGE UP (ref 0–0.9)
INR BLD: 0.91 RATIO — SIGNIFICANT CHANGE UP (ref 0.85–1.16)
LYMPHOCYTES # BLD AUTO: 1.36 K/UL — SIGNIFICANT CHANGE UP (ref 1–3.3)
LYMPHOCYTES NFR BLD AUTO: 22.4 % — SIGNIFICANT CHANGE UP (ref 13–44)
MCHC RBC-ENTMCNC: 31.1 PG — SIGNIFICANT CHANGE UP (ref 27–34)
MCHC RBC-ENTMCNC: 34 G/DL — SIGNIFICANT CHANGE UP (ref 32–36)
MCV RBC AUTO: 91.5 FL — SIGNIFICANT CHANGE UP (ref 80–100)
MONOCYTES # BLD AUTO: 0.47 K/UL — SIGNIFICANT CHANGE UP (ref 0–0.9)
MONOCYTES NFR BLD AUTO: 7.7 % — SIGNIFICANT CHANGE UP (ref 2–14)
NEUTROPHILS # BLD AUTO: 4.04 K/UL — SIGNIFICANT CHANGE UP (ref 1.8–7.4)
NEUTROPHILS NFR BLD AUTO: 66.4 % — SIGNIFICANT CHANGE UP (ref 43–77)
NRBC # BLD AUTO: 0 K/UL — SIGNIFICANT CHANGE UP (ref 0–0)
NRBC # FLD: 0 K/UL — SIGNIFICANT CHANGE UP (ref 0–0)
NRBC BLD AUTO-RTO: 0 /100 WBCS — SIGNIFICANT CHANGE UP (ref 0–0)
PLATELET # BLD AUTO: 258 K/UL — SIGNIFICANT CHANGE UP (ref 150–400)
PMV BLD: 9.2 FL — SIGNIFICANT CHANGE UP (ref 7–13)
POTASSIUM SERPL-MCNC: 4.1 MMOL/L — SIGNIFICANT CHANGE UP (ref 3.5–5.3)
POTASSIUM SERPL-SCNC: 4.1 MMOL/L — SIGNIFICANT CHANGE UP (ref 3.5–5.3)
PROT SERPL-MCNC: 7.5 G/DL — SIGNIFICANT CHANGE UP (ref 6–8.3)
PROTHROM AB SERPL-ACNC: 10.6 SEC — SIGNIFICANT CHANGE UP (ref 9.9–13.4)
RBC # BLD: 3.86 M/UL — LOW (ref 4.2–5.8)
RBC # FLD: 14.9 % — HIGH (ref 10.3–14.5)
SODIUM SERPL-SCNC: 138 MMOL/L — SIGNIFICANT CHANGE UP (ref 135–145)
TROPONIN T, HIGH SENSITIVITY RESULT: 24 NG/L — HIGH
TROPONIN T, HIGH SENSITIVITY RESULT: 26 NG/L — HIGH
WBC # BLD: 6.08 K/UL — SIGNIFICANT CHANGE UP (ref 3.8–10.5)
WBC # FLD AUTO: 6.08 K/UL — SIGNIFICANT CHANGE UP (ref 3.8–10.5)

## 2025-09-14 PROCEDURE — 0042T: CPT

## 2025-09-14 PROCEDURE — 70450 CT HEAD/BRAIN W/O DYE: CPT | Mod: 26,XU

## 2025-09-14 PROCEDURE — 70496 CT ANGIOGRAPHY HEAD: CPT | Mod: 26

## 2025-09-14 PROCEDURE — 99285 EMERGENCY DEPT VISIT HI MDM: CPT

## 2025-09-14 PROCEDURE — 70498 CT ANGIOGRAPHY NECK: CPT | Mod: 26

## 2025-09-14 RX ORDER — MECLIZINE HCL 12.5 MG
25 TABLET ORAL ONCE
Refills: 0 | Status: COMPLETED | OUTPATIENT
Start: 2025-09-14 | End: 2025-09-14

## 2025-09-14 RX ORDER — MECLIZINE HCL 12.5 MG
1 TABLET ORAL
Qty: 15 | Refills: 0
Start: 2025-09-14 | End: 2025-09-18

## 2025-09-14 RX ADMIN — Medication 25 MILLIGRAM(S): at 13:41
